# Patient Record
Sex: FEMALE | Race: WHITE | Employment: FULL TIME | URBAN - METROPOLITAN AREA
[De-identification: names, ages, dates, MRNs, and addresses within clinical notes are randomized per-mention and may not be internally consistent; named-entity substitution may affect disease eponyms.]

---

## 2017-03-22 ENCOUNTER — OFFICE VISIT (OUTPATIENT)
Dept: FAMILY MEDICINE | Facility: CLINIC | Age: 42
End: 2017-03-22
Payer: COMMERCIAL

## 2017-03-22 VITALS
DIASTOLIC BLOOD PRESSURE: 73 MMHG | HEIGHT: 63 IN | TEMPERATURE: 97.3 F | OXYGEN SATURATION: 95 % | SYSTOLIC BLOOD PRESSURE: 111 MMHG | HEART RATE: 84 BPM | BODY MASS INDEX: 22.01 KG/M2 | WEIGHT: 124.2 LBS

## 2017-03-22 DIAGNOSIS — J40 BRONCHITIS: Primary | ICD-10-CM

## 2017-03-22 PROCEDURE — 99213 OFFICE O/P EST LOW 20 MIN: CPT | Performed by: NURSE PRACTITIONER

## 2017-03-22 RX ORDER — ALBUTEROL SULFATE 90 UG/1
2 AEROSOL, METERED RESPIRATORY (INHALATION) EVERY 6 HOURS PRN
Qty: 1 INHALER | Refills: 0 | Status: SHIPPED | OUTPATIENT
Start: 2017-03-22 | End: 2017-08-30

## 2017-03-22 RX ORDER — BENZONATATE 200 MG/1
200 CAPSULE ORAL 3 TIMES DAILY PRN
Qty: 21 CAPSULE | Refills: 0 | Status: SHIPPED | OUTPATIENT
Start: 2017-03-22 | End: 2017-04-11

## 2017-03-22 RX ORDER — CODEINE PHOSPHATE AND GUAIFENESIN 10; 100 MG/5ML; MG/5ML
1 SOLUTION ORAL EVERY 4 HOURS PRN
Qty: 120 ML | Refills: 0 | Status: SHIPPED | OUTPATIENT
Start: 2017-03-22 | End: 2017-04-11

## 2017-03-22 NOTE — PATIENT INSTRUCTIONS
1. Use albuterol inhaler as needed for coughing, wheezing or shortness of breath  2. Use cough syrup at bedtime and take Tessalon perles as needed during the day for cough          Thank you for choosing [facility].  You may be receiving a survey in the mail from Tim Saenz regarding your visit today.  Please take a few minutes to complete and return the survey to let us know how we are doing.      If you have questions or concerns, please contact us via [employer] or you can contact your care team at [phone].    Our Clinic hours are:  Monday 6:40 am  to 7:00 pm  Tuesday -Friday 6:40 am to 5:00 pm    The [place] outpatient lab hours are:  Monday - Friday 6:10 am to 4:45 pm  Saturdays 7:00 am to 11:00 am  Appointments are required, call [phone]    If you have clinical questions after hours or would like to schedule an appointment,  call the clinic at [phone].

## 2017-03-22 NOTE — NURSING NOTE
"Chief Complaint   Patient presents with     Cough     cough, started Saturday, has gotten worse, chest congestion       Initial /73 (BP Location: Left arm, Patient Position: Chair, Cuff Size: Adult Regular)  Pulse 84  Temp 97.3  F (36.3  C) (Tympanic)  Ht 5' 2.75\" (1.594 m)  Wt 124 lb 3.2 oz (56.3 kg)  LMP 08/12/2016  SpO2 95%  BMI 22.18 kg/m2 Estimated body mass index is 22.18 kg/(m^2) as calculated from the following:    Height as of this encounter: 5' 2.75\" (1.594 m).    Weight as of this encounter: 124 lb 3.2 oz (56.3 kg).  Medication Reconciliation: complete    "

## 2017-03-22 NOTE — PROGRESS NOTES
SUBJECTIVE:                                                    Vianey Ryan is a 41 year old female who presents to clinic today for the following health issues:    Acute Illness- 3 days-    Acute illness concerns: cough  Onset: started saturday    Fever: YES- mild one yesterday    Chills/Sweats: YES    Headache (location?): no    Sinus Pressure:no    Conjunctivitis:  no    Ear Pain: no    Rhinorrhea: no    Congestion: YES    Sore Throat: YES     Cough: YES-non-productive, worsening over time    Wheeze: YES    Decreased Appetite: YES    Nausea: no    Vomiting: no    Diarrhea:  no    Dysuria/Freq.: no    Fatigue/Achiness: YES- fatigue    Sick/Strep Exposure: YES- 3 kids    Coughing X 3 days- unable to sleep. No fevers. Sore throat from coughing. No sinus pressure.  No history of asthma.      Therapies Tried and outcome: ibuprofen      -------------------------------------    Problem list and histories reviewed & adjusted, as indicated.  Additional history: as documented    Patient Active Problem List   Diagnosis     Basal cell carcinoma of forehead     IBS (irritable bowel syndrome)     CARDIOVASCULAR SCREENING; LDL GOAL LESS THAN 160     Abnormal thyroid function test     health care home     Goiter     Hyperthyroidism     Past Surgical History:   Procedure Laterality Date     C/SECTION, LOW TRANSVERSE  2006    Severe pre-eclampsia      SECTION, TUBAL LIGATION, COMBINED N/A 4/3/2015    Procedure: COMBINED  SECTION, TUBAL LIGATION;  Surgeon: Tay Mosqueda MD;  Location: [facility]     COLONOSCOPY  2013    Procedure: COLONOSCOPY;  Colonoscopy;  Surgeon: Manan Rangel MD;  Location: [facility]     CYSTOSCOPY N/A 2016    Procedure: CYSTOSCOPY;  Surgeon: Tay Mosqueda MD;  Location: [facility]     D & C  8/6/10     HC MOHS HEAD/NCK/HND/FT/GEN 1ST STAGE UP T0 5 BLOCKS  09    Mohs excision right central forehead BCC     HYSTEROSCOPY  8/6/10     LAPAROSCOPIC CHOLECYSTECTOMY N/A  "9/29/2015    Procedure: LAPAROSCOPIC CHOLECYSTECTOMY;  Surgeon: Landon Schultz MD;  Location: [facility]     LAPAROSCOPIC HYSTERECTOMY TOTAL N/A 8/29/2016    Procedure: LAPAROSCOPIC HYSTERECTOMY TOTAL;  Surgeon: Tay Mosqueda MD;  Location: [facility]     MOHS MICROGRAPHIC PROCEDURE       TONSILLECTOMY         Social History   Substance Use Topics     Smoking status: Never Smoker     Smokeless tobacco: Never Used     Alcohol use No     Family History   Problem Relation Age of Onset     Hypertension Father      Hypertension Paternal Grandmother      Lipids Paternal Grandmother      Thyroid Disease Paternal Grandmother      Blood Disease Mother      Anemia     DIABETES Mother      \"borderline diabetic\"     Alcohol/Drug Paternal Grandfather      alcohol     CANCER Maternal Grandfather      skin     Alzheimer Disease Maternal Grandmother      Breast Cancer No family hx of            Reviewed and updated as needed this visit by clinical staff  Tobacco  Allergies  Med Hx  Surg Hx  Fam Hx  Soc Hx      Reviewed and updated as needed this visit by Provider         ROS:  Constitutional, HEENT, cardiovascular, pulmonary, GI, , musculoskeletal, neuro, skin, endocrine and psych systems are negative, except as otherwise noted.    OBJECTIVE:                                                    /73 (BP Location: Left arm, Patient Position: Chair, Cuff Size: Adult Regular)  Pulse 84  Temp 97.3  F (36.3  C) (Tympanic)  Ht 5' 2.75\" (1.594 m)  Wt 124 lb 3.2 oz (56.3 kg)  LMP 08/12/2016  SpO2 95%  BMI 22.18 kg/m2  Body mass index is 22.18 kg/(m^2).  GENERAL: alert, no distress, fatigued and non productive cough during exam  HENT: normal cephalic/atraumatic, ear canals and TM's normal, nose and mouth without ulcers or lesions, oropharynx clear and oral mucous membranes moist  NECK: supple  RESP: lungs clear to auscultation - no rales, rhonchi or wheezes  CV: regular rate and rhythm, normal S1 S2, no S3 or S4, no murmur, " click or rub  MS: no gross musculoskeletal defects noted, no edema    Diagnostic Test Results:  none      ASSESSMENT/PLAN:                                                        1. Bronchitis  Cough X 3 days  - guaiFENesin-codeine (ROBITUSSIN AC) 100-10 MG/5ML SOLN solution; Take 5 mLs by mouth every 4 hours as needed for cough  Dispense: 120 mL; Refill: 0  - albuterol (PROAIR HFA/PROVENTIL HFA/VENTOLIN HFA) 108 (90 BASE) MCG/ACT Inhaler; Inhale 2 puffs into the lungs every 6 hours as needed for shortness of breath / dyspnea or wheezing  Dispense: 1 Inhaler; Refill: 0  - benzonatate (TESSALON) 200 MG capsule; Take 1 capsule (200 mg) by mouth 3 times daily as needed for cough  Dispense: 21 capsule; Refill: 0        YAO [facility]

## 2017-03-22 NOTE — MR AVS SNAPSHOT
After Visit Summary   3/22/2017    Vianey Ryan    MRN: [id number]           Patient Information     Date Of Birth          1975        Visit Information        Provider Department      3/22/2017 7:00 AM Abby Yang APRN CNP [facility]        Today's Diagnoses     Bronchitis    -  1      Care Instructions    1. Use albuterol inhaler as needed for coughing, wheezing or shortness of breath  2. Use cough syrup at bedtime and take Tessalon perles as needed during the day for cough          Thank you for choosing [facility].  You may be receiving a survey in the mail from [employer] regarding your visit today.  Please take a few minutes to complete and return the survey to let us know how we are doing.      If you have questions or concerns, please contact us via [employer] or you can contact your care team at [phone].    Our Clinic hours are:  Monday 6:40 am  to 7:00 pm  Tuesday -Friday 6:40 am to 5:00 pm    The [place] outpatient lab hours are:  Monday - Friday 6:10 am to 4:45 pm  Saturdays 7:00 am to 11:00 am  Appointments are required, call [phone]    If you have clinical questions after hours or would like to schedule an appointment,  call the clinic at [phone].        Follow-ups after your visit        Who to contact     If you have questions or need follow up information about today's clinic visit or your schedule please contact [facility] directly at [phone].  Normal or non-critical lab and imaging results will be communicated to you by [employer], letter or phone within 4 business days after the clinic has received the results. If you do not hear from us within 7 days, please contact the clinic through [employer] or phone. If you have a critical or abnormal lab result, we will notify you by phone as soon as possible.  Submit refill requests through [employer] or call your pharmacy and they will forward the refill request to us. Please allow  "3 business days for your refill to be completed.          Additional Information About Your Visit        [employer] Information     [employer] gives you secure access to your electronic health record. If you see a primary care provider, you can also send messages to your care team and make appointments. If you have questions, please call your primary care clinic.  If you do not have a primary care provider, please call [phone] and they will assist you.        Care EveryWhere ID     This is your Care EveryWhere ID. This could be used by other organizations to access your [facility] medical records  [id number]        Your Vitals Were     Pulse Temperature Height Last Period Pulse Oximetry BMI (Body Mass Index)    84 97.3  F (36.3  C) (Tympanic) 5' 2.75\" (1.594 m) 08/12/2016 95% 22.18 kg/m2       Blood Pressure from Last 3 Encounters:   03/22/17 111/73   10/18/16 110/78   08/29/16 (P) 117/76    Weight from Last 3 Encounters:   03/22/17 124 lb 3.2 oz (56.3 kg)   10/18/16 124 lb (56.2 kg)   08/29/16 125 lb (56.7 kg)              Today, you had the following     No orders found for display         Today's Medication Changes          These changes are accurate as of: 3/22/17  7:15 AM.  If you have any questions, ask your nurse or doctor.               Start taking these medicines.        Dose/Directions    albuterol 108 (90 BASE) MCG/ACT Inhaler   Commonly known as:  PROAIR HFA/PROVENTIL HFA/VENTOLIN HFA   Used for:  Bronchitis   Started by:  Abby Yang APRN CNP        Dose:  2 puff   Inhale 2 puffs into the lungs every 6 hours as needed for shortness of breath / dyspnea or wheezing   Quantity:  1 Inhaler   Refills:  0       benzonatate 200 MG capsule   Commonly known as:  TESSALON   Used for:  Bronchitis   Started by:  Abby Ynag APRN CNP        Dose:  200 mg   Take 1 capsule (200 mg) by mouth 3 times daily as needed for cough   Quantity:  21 capsule   Refills:  0       guaiFENesin-codeine 100-10 MG/5ML " Soln solution   Commonly known as:  ROBITUSSIN AC   Used for:  Bronchitis   Started by:  Abby Yang APRN CNP        Dose:  1 tsp.   Take 5 mLs by mouth every 4 hours as needed for cough   Quantity:  120 mL   Refills:  0            Where to get your medicines      These medications were sent to [facility] [place], [place] - [address]  [address] [address]     Phone:  [phone]     albuterol 108 (90 BASE) MCG/ACT Inhaler    benzonatate 200 MG capsule         Some of these will need a paper prescription and others can be bought over the counter.  Ask your nurse if you have questions.     Bring a paper prescription for each of these medications     guaiFENesin-codeine 100-10 MG/5ML Soln solution                Primary Care Provider Office Phone # Fax #    Joes Hearn [phone]       [facility] [address] [address]        Thank you!     Thank you for choosing [facility]  for your care. Our goal is always to provide you with excellent care. Hearing back from our patients is one way we can continue to improve our services. Please take a few minutes to complete the written survey that you may receive in the mail after your visit with us. Thank you!             Your Updated Medication List - Protect others around you: Learn how to safely use, store and throw away your medicines at www.disposemymeds.org.          This list is accurate as of: 3/22/17  7:15 AM.  Always use your most recent med list.                   Brand Name Dispense Instructions for use    albuterol 108 (90 BASE) MCG/ACT Inhaler    PROAIR HFA/PROVENTIL HFA/VENTOLIN HFA    1 Inhaler    Inhale 2 puffs into the lungs every 6 hours as needed for shortness of breath / dyspnea or wheezing       benzonatate 200 MG capsule    TESSALON    21 capsule    Take 1 capsule (200 mg) by mouth 3 times daily as needed for cough       guaiFENesin-codeine 100-10  MG/5ML Soln solution    ROBITUSSIN AC    120 mL    Take 5 mLs by mouth every 4 hours as needed for cough

## 2017-03-23 ENCOUNTER — APPOINTMENT (OUTPATIENT)
Dept: GENERAL RADIOLOGY | Facility: CLINIC | Age: 42
End: 2017-03-23
Attending: EMERGENCY MEDICINE
Payer: COMMERCIAL

## 2017-03-23 ENCOUNTER — HOSPITAL ENCOUNTER (EMERGENCY)
Facility: CLINIC | Age: 42
Discharge: HOME | End: 2017-03-23
Attending: EMERGENCY MEDICINE | Admitting: EMERGENCY MEDICINE
Payer: COMMERCIAL

## 2017-03-23 VITALS
DIASTOLIC BLOOD PRESSURE: 72 MMHG | HEART RATE: 86 BPM | TEMPERATURE: 99.1 F | OXYGEN SATURATION: 99 % | SYSTOLIC BLOOD PRESSURE: 120 MMHG | RESPIRATION RATE: 16 BRPM

## 2017-03-23 DIAGNOSIS — J11.1 INFLUENZA-LIKE ILLNESS: ICD-10-CM

## 2017-03-23 DIAGNOSIS — H65.92 LEFT NON-SUPPURATIVE OTITIS MEDIA: ICD-10-CM

## 2017-03-23 LAB
ANION GAP SERPL CALCULATED.3IONS-SCNC: 7 MMOL/L (ref 3–14)
BASOPHILS # BLD AUTO: 0 10E9/L (ref 0–0.2)
BASOPHILS NFR BLD AUTO: 0.1 %
BUN SERPL-MCNC: 11 MG/DL (ref 7–30)
CALCIUM SERPL-MCNC: 8.3 MG/DL (ref 8.5–10.1)
CHLORIDE SERPL-SCNC: 109 MMOL/L (ref 94–109)
CO2 SERPL-SCNC: 26 MMOL/L (ref 20–32)
CREAT SERPL-MCNC: 0.65 MG/DL (ref 0.52–1.04)
DIFFERENTIAL METHOD BLD: ABNORMAL
EOSINOPHIL # BLD AUTO: 0 10E9/L (ref 0–0.7)
EOSINOPHIL NFR BLD AUTO: 0.4 %
ERYTHROCYTE [DISTWIDTH] IN BLOOD BY AUTOMATED COUNT: 12.1 % (ref 10–15)
FLUAV+FLUBV AG SPEC QL: NEGATIVE
FLUAV+FLUBV AG SPEC QL: NORMAL
GFR SERPL CREATININE-BSD FRML MDRD: ABNORMAL ML/MIN/1.7M2
GLUCOSE SERPL-MCNC: 120 MG/DL (ref 70–99)
HCT VFR BLD AUTO: 36.2 % (ref 35–47)
HGB BLD-MCNC: 12.3 G/DL (ref 11.7–15.7)
IMM GRANULOCYTES # BLD: 0 10E9/L (ref 0–0.4)
IMM GRANULOCYTES NFR BLD: 0.1 %
LACTATE BLD-SCNC: 0.5 MMOL/L (ref 0.7–2.1)
LYMPHOCYTES # BLD AUTO: 0.5 10E9/L (ref 0.8–5.3)
LYMPHOCYTES NFR BLD AUTO: 7.4 %
MCH RBC QN AUTO: 30 PG (ref 26.5–33)
MCHC RBC AUTO-ENTMCNC: 34 G/DL (ref 31.5–36.5)
MCV RBC AUTO: 88 FL (ref 78–100)
MONOCYTES # BLD AUTO: 0.7 10E9/L (ref 0–1.3)
MONOCYTES NFR BLD AUTO: 10 %
NEUTROPHILS # BLD AUTO: 5.7 10E9/L (ref 1.6–8.3)
NEUTROPHILS NFR BLD AUTO: 82 %
PLATELET # BLD AUTO: 165 10E9/L (ref 150–450)
POTASSIUM SERPL-SCNC: 3.4 MMOL/L (ref 3.4–5.3)
RBC # BLD AUTO: 4.1 10E12/L (ref 3.8–5.2)
SODIUM SERPL-SCNC: 142 MMOL/L (ref 133–144)
SPECIMEN SOURCE: NORMAL
WBC # BLD AUTO: 7 10E9/L (ref 4–11)

## 2017-03-23 PROCEDURE — 71020 XR CHEST 2 VW: CPT

## 2017-03-23 PROCEDURE — 87804 INFLUENZA ASSAY W/OPTIC: CPT | Performed by: EMERGENCY MEDICINE

## 2017-03-23 PROCEDURE — 96361 HYDRATE IV INFUSION ADD-ON: CPT

## 2017-03-23 PROCEDURE — 85025 COMPLETE CBC W/AUTO DIFF WBC: CPT | Performed by: EMERGENCY MEDICINE

## 2017-03-23 PROCEDURE — 99284 EMERGENCY DEPT VISIT MOD MDM: CPT | Performed by: EMERGENCY MEDICINE

## 2017-03-23 PROCEDURE — 99284 EMERGENCY DEPT VISIT MOD MDM: CPT | Mod: 25

## 2017-03-23 PROCEDURE — 96375 TX/PRO/DX INJ NEW DRUG ADDON: CPT

## 2017-03-23 PROCEDURE — 83605 ASSAY OF LACTIC ACID: CPT | Performed by: EMERGENCY MEDICINE

## 2017-03-23 PROCEDURE — 80048 BASIC METABOLIC PNL TOTAL CA: CPT | Performed by: EMERGENCY MEDICINE

## 2017-03-23 PROCEDURE — 96374 THER/PROPH/DIAG INJ IV PUSH: CPT

## 2017-03-23 PROCEDURE — 25000128 H RX IP 250 OP 636: Performed by: EMERGENCY MEDICINE

## 2017-03-23 RX ORDER — SODIUM CHLORIDE 9 MG/ML
1000 INJECTION, SOLUTION INTRAVENOUS CONTINUOUS
Status: DISCONTINUED | OUTPATIENT
Start: 2017-03-23 | End: 2017-03-23 | Stop reason: HOSPADM

## 2017-03-23 RX ORDER — PREDNISONE 20 MG/1
40 TABLET ORAL DAILY
Qty: 10 TABLET | Refills: 0 | Status: SHIPPED | OUTPATIENT
Start: 2017-03-23 | End: 2017-03-28

## 2017-03-23 RX ORDER — METOCLOPRAMIDE HYDROCHLORIDE 5 MG/ML
5 INJECTION INTRAMUSCULAR; INTRAVENOUS ONCE
Status: COMPLETED | OUTPATIENT
Start: 2017-03-23 | End: 2017-03-23

## 2017-03-23 RX ORDER — AMOXICILLIN 875 MG
875 TABLET ORAL 2 TIMES DAILY
Qty: 20 TABLET | Refills: 0 | Status: SHIPPED | OUTPATIENT
Start: 2017-03-23 | End: 2017-04-02

## 2017-03-23 RX ORDER — DIPHENHYDRAMINE HYDROCHLORIDE 50 MG/ML
12.5 INJECTION INTRAMUSCULAR; INTRAVENOUS ONCE
Status: COMPLETED | OUTPATIENT
Start: 2017-03-23 | End: 2017-03-23

## 2017-03-23 RX ORDER — KETOROLAC TROMETHAMINE 30 MG/ML
30 INJECTION, SOLUTION INTRAMUSCULAR; INTRAVENOUS ONCE
Status: COMPLETED | OUTPATIENT
Start: 2017-03-23 | End: 2017-03-23

## 2017-03-23 RX ADMIN — METOCLOPRAMIDE 5 MG: 5 INJECTION, SOLUTION INTRAMUSCULAR; INTRAVENOUS at 10:20

## 2017-03-23 RX ADMIN — SODIUM CHLORIDE 1000 ML: 9 INJECTION, SOLUTION INTRAVENOUS at 10:16

## 2017-03-23 RX ADMIN — DIPHENHYDRAMINE HYDROCHLORIDE 12.5 MG: 50 INJECTION, SOLUTION INTRAMUSCULAR; INTRAVENOUS at 10:17

## 2017-03-23 RX ADMIN — KETOROLAC TROMETHAMINE 30 MG: 30 INJECTION, SOLUTION INTRAMUSCULAR at 10:18

## 2017-03-23 NOTE — ED PROVIDER NOTES
Chief complaint high headache cough    41-year-old female nonsmoker, received influenza vaccine this year, presents with cough that began 5 days ago, low-grade fever throughout, developed worsening headache global overnight last night, left ear pain described as pressure and decreased hearing on that side.  Denies dyspnea on exertion or near syncope.  She's had nausea without vomiting.  No solid intake today, she did urinate once so far.  Son and daughter with similar symptoms.  No history of asthma.    Past medical history, medications, allergies, social history, family history all reviewed.  Patient Active Problem List   Diagnosis     Basal cell carcinoma of forehead     IBS (irritable bowel syndrome)     CARDIOVASCULAR SCREENING; LDL GOAL LESS THAN 160     Abnormal thyroid function test     health care home     Goiter     Hyperthyroidism     ROS: All other systems reviewed and are negative.    /70  Temp 99.1  F (37.3  C) (Temporal)  Resp 16  LMP 08/12/2016  SpO2 95%  Nontoxic appearing no respiratory distress alert and oriented ×3  Head atraumatic normocephalic  TMs/EACs normal except left TM dull red and retracted,, conjunctiva noninjected, oropharynx moist without lesions or erythema  No cervical adenopathy neck supple full active painless range of motion  Lungs clear to auscultation, scant wheeze/rales right base, harsh loose cough  Heart regular tachycardic no murmur  Abdomen soft nontender bowel sounds positive no masses or HSM  Strength and sensation grossly intact throughout the extremities, gait and station normal  Speech is fluent, good eye contact, thought processes are rational    Results for orders placed or performed during the hospital encounter of 03/23/17   XR Chest 2 Views    Narrative    XR CHEST 2 VW  3/23/2017 10:44 AM    HISTORY:  cough and fever    COMPARISON:  4/8/2008      Impression    IMPRESSION:  Negative.     EFRAIN MONTERROSO MD   CBC with platelets differential   Result Value  Ref Range    WBC 7.0 4.0 - 11.0 10e9/L    RBC Count 4.10 3.8 - 5.2 10e12/L    Hemoglobin 12.3 11.7 - 15.7 g/dL    Hematocrit 36.2 35.0 - 47.0 %    MCV 88 78 - 100 fl    MCH 30.0 26.5 - 33.0 pg    MCHC 34.0 31.5 - 36.5 g/dL    RDW 12.1 10.0 - 15.0 %    Platelet Count 165 150 - 450 10e9/L    Diff Method Automated Method     % Neutrophils 82.0 %    % Lymphocytes 7.4 %    % Monocytes 10.0 %    % Eosinophils 0.4 %    % Basophils 0.1 %    % Immature Granulocytes 0.1 %    Absolute Neutrophil 5.7 1.6 - 8.3 10e9/L    Absolute Lymphocytes 0.5 (L) 0.8 - 5.3 10e9/L    Absolute Monocytes 0.7 0.0 - 1.3 10e9/L    Absolute Eosinophils 0.0 0.0 - 0.7 10e9/L    Absolute Basophils 0.0 0.0 - 0.2 10e9/L    Abs Immature Granulocytes 0.0 0 - 0.4 10e9/L   Basic metabolic panel   Result Value Ref Range    Sodium 142 133 - 144 mmol/L    Potassium 3.4 3.4 - 5.3 mmol/L    Chloride 109 94 - 109 mmol/L    Carbon Dioxide 26 20 - 32 mmol/L    Anion Gap 7 3 - 14 mmol/L    Glucose 120 (H) 70 - 99 mg/dL    Urea Nitrogen 11 7 - 30 mg/dL    Creatinine 0.65 0.52 - 1.04 mg/dL    GFR Estimate >90  Non  GFR Calc   >60 mL/min/1.7m2    GFR Estimate If Black >90   GFR Calc   >60 mL/min/1.7m2    Calcium 8.3 (L) 8.5 - 10.1 mg/dL   Lactic acid whole blood   Result Value Ref Range    Lactic Acid 0.5 (L) 0.7 - 2.1 mmol/L   Influenza A/B antigen   Result Value Ref Range    Influenza A/B Agn Specimen Nasal     Influenza A Negative NEG    Influenza B  NEG     Negative   Test results must be correlated with clinical data. If necessary, results   should be confirmed by a molecular assay or viral culture.       Medications   0.9% sodium chloride BOLUS (0 mLs Intravenous Stopped 3/23/17 1308)   ketorolac (TORADOL) injection 30 mg (30 mg Intravenous Given 3/23/17 1018)   metoclopramide (REGLAN) injection 5 mg (5 mg Intravenous Given 3/23/17 1020)   diphenhydrAMINE (BENADRYL) injection 12.5 mg (12.5 mg Intravenous Given 3/23/17 1017)     MDM:  41-year-old female presents with influenza-like illness headache left otalgia.  Workup is unremarkable.  Duration of symptoms precludes treatment with antiviral.  Feeling better after above regimen.  Treat left ear with amoxicillin.  Discharged home in improved stable condition care of her .  Return criteria viewed.  Patient expressed understanding and agreement.    Impression: Influenza-like illness, left otitis media     Morgna Diaz MD  03/23/17 6099

## 2017-03-23 NOTE — ED AVS SNAPSHOT
[facility]    [address] [facility] [address]    Phone:  [phone]    Fax:  [phone]                                       Vianey Ryan   MRN: [id number]    Department:  [facility]   Date of Visit:  3/23/2017           Patient Information     Date Of Birth          1975        Your diagnoses for this visit were:     Influenza-like illness     Left non-suppurative otitis media        You were seen by Morgan Diaz MD.      Follow-up Information     Follow up with Jose Hearn MD.    Specialty:  Family Practice    Contact information:    [facility]  [address] [facility] [address]  [phone]          Discharge Instructions         Middle Ear Infection (Adult)  You have an infection of the middle ear (the space behind the eardrum). This is also called acute otitis media (AOM). Sometimes it is caused by the common cold. This is because congestion can block the internal passage (eustachian tube) that drains fluid from the middle ear. When the middle ear fills with fluid, bacteria can grow there and cause an infection. Oral antibiotics are used to treat this illness, not ear drops. Symptoms usually start to improve within 1 to 2 days of treatment.    Home care  The following are general care guidelines:    Finish all of the antibiotic medicine given, even though you may feel better after the first few days.    You may use acetaminophen or ibuprofen to control pain, unless something else was prescribed. [NOTE: If you have chronic liver or kidney disease or have ever had a stomach ulcer or GI bleeding, talk with your doctor before using these medicines.] Do not give aspirin to anyone under 18 years of age who has a fever. It may cause severe liver damage.  Follow-up care  Follow up with your doctor in 2 weeks if all symptoms have not gotten better, or if hearing doesn't go back to normal within 1 month.  When to  seek medical care  Get prompt medical attention if any of the following occur:    Ear pain gets worse or does not improve after 3 days of treatment    Unusual drowsiness or confusion    Neck pain, stiff neck, or headache    Fluid or blood draining from the ear canal    Fever of 100.4 F (38 C) or higher after 3 days of antibiotics, or as directed by your health care provider    Convulsion (seizure)    7658-8066 [employer]. [address] [place] [address], [place] [address]. All rights reserved. This information is not intended as a substitute for professional medical care. Always follow your healthcare professional's instructions.          Discharge References/Attachments     INFLUENZA  (ENGLISH)      24 Hour Appointment Hotline       To make an appointment at any [facility], call [phone] ([phone]). If you don't have a family doctor or clinic, we will help you find one. [place] clinics are conveniently located to serve the needs of you and your family.             Review of your medicines      START taking        Dose / Directions Last dose taken    amoxicillin 875 MG tablet   Commonly known as:  AMOXIL   Dose:  875 mg   Quantity:  20 tablet        Take 1 tablet (875 mg) by mouth 2 times daily for 10 days   Refills:  0        predniSONE 20 MG tablet   Commonly known as:  DELTASONE   Dose:  40 mg   Quantity:  10 tablet        Take 2 tablets (40 mg) by mouth daily for 5 days   Refills:  0          Our records show that you are taking the medicines listed below. If these are incorrect, please call your family doctor or clinic.        Dose / Directions Last dose taken    albuterol 108 (90 BASE) MCG/ACT Inhaler   Commonly known as:  PROAIR HFA/PROVENTIL HFA/VENTOLIN HFA   Dose:  2 puff   Quantity:  1 Inhaler        Inhale 2 puffs into the lungs every 6 hours as needed for shortness of breath / dyspnea or wheezing   Refills:  0        benzonatate 200 MG capsule   Commonly known as:  TESSALON    Dose:  200 mg   Quantity:  21 capsule        Take 1 capsule (200 mg) by mouth 3 times daily as needed for cough   Refills:  0        guaiFENesin-codeine 100-10 MG/5ML Soln solution   Commonly known as:  ROBITUSSIN AC   Dose:  1 tsp.   Quantity:  120 mL        Take 5 mLs by mouth every 4 hours as needed for cough   Refills:  0                Prescriptions were sent or printed at these locations (2 Prescriptions)                   [place], [place] - [address] [facility]   [address] [facility] [address]    Telephone:  [phone]   Fax:  [phone]   Hours:                  E-Prescribed (2 of 2)         predniSONE (DELTASONE) 20 MG tablet               amoxicillin (AMOXIL) 875 MG tablet                Procedures and tests performed during your visit     Basic metabolic panel    CBC with platelets differential    Influenza A/B antigen    Lactic acid whole blood    XR Chest 2 Views      Orders Needing Specimen Collection     None      Pending Results     No orders found from 3/21/2017 to 3/24/2017.            Pending Culture Results     No orders found from 3/21/2017 to 3/24/2017.             Test Results from your hospital stay     3/23/2017 10:26 AM - Interface, [facility] Results      Component Results     Component Value Ref Range & Units Status    WBC 7.0 4.0 - 11.0 10e9/L Final    RBC Count 4.10 3.8 - 5.2 10e12/L Final    Hemoglobin 12.3 11.7 - 15.7 g/dL Final    Hematocrit 36.2 35.0 - 47.0 % Final    MCV 88 78 - 100 fl Final    MCH 30.0 26.5 - 33.0 pg Final    MCHC 34.0 31.5 - 36.5 g/dL Final    RDW 12.1 10.0 - 15.0 % Final    Platelet Count 165 150 - 450 10e9/L Final    Diff Method Automated Method  Final    % Neutrophils 82.0 % Final    % Lymphocytes 7.4 % Final    % Monocytes 10.0 % Final    % Eosinophils 0.4 % Final    % Basophils 0.1 % Final    % Immature Granulocytes 0.1 % Final    Absolute Neutrophil 5.7 1.6 - 8.3 10e9/L Final    Absolute Lymphocytes 0.5 (L) 0.8 - 5.3 10e9/L  Final    Absolute Monocytes 0.7 0.0 - 1.3 10e9/L Final    Absolute Eosinophils 0.0 0.0 - 0.7 10e9/L Final    Absolute Basophils 0.0 0.0 - 0.2 10e9/L Final    Abs Immature Granulocytes 0.0 0 - 0.4 10e9/L Final         3/23/2017 10:50 AM - Interface, [employer] Results      Component Results     Component Value Ref Range & Units Status    Sodium 142 133 - 144 mmol/L Final    Potassium 3.4 3.4 - 5.3 mmol/L Final    Chloride 109 94 - 109 mmol/L Final    Carbon Dioxide 26 20 - 32 mmol/L Final    Anion Gap 7 3 - 14 mmol/L Final    Glucose 120 (H) 70 - 99 mg/dL Final    Urea Nitrogen 11 7 - 30 mg/dL Final    Creatinine 0.65 0.52 - 1.04 mg/dL Final    GFR Estimate >90  Non  GFR Calc   >60 mL/min/1.7m2 Final    GFR Estimate If Black >90   GFR Calc   >60 mL/min/1.7m2 Final    Calcium 8.3 (L) 8.5 - 10.1 mg/dL Final         3/23/2017 10:26 AM - Interface, [employer] Results      Component Results     Component Value Ref Range & Units Status    Lactic Acid 0.5 (L) 0.7 - 2.1 mmol/L Final         3/23/2017 10:51 AM - Interface, [facility] Ib      Narrative     XR CHEST 2 VW  3/23/2017 10:44 AM    HISTORY:  cough and fever    COMPARISON:  4/8/2008        Impression     IMPRESSION:  Negative.     EFRAIN MONTERROSO MD         3/23/2017 10:51 AM - Interface, [employer] Results      Component Results     Component Value Ref Range & Units Status    Influenza A/B Agn Specimen Nasal  Final    Influenza A Negative NEG Final    Influenza B  NEG Final    Negative   Test results must be correlated with clinical data. If necessary, results   should be confirmed by a molecular assay or viral culture.                  Thank you for choosing [facility]       Thank you for choosing [facility] for your care. Our goal is always to provide you with excellent care. Hearing back from our patients is one way we can continue to improve our services. Please take a few minutes to complete the written survey that you may receive in the mail  after you visit with us. Thank you!        [employer] Information     [employer] gives you secure access to your electronic health record. If you see a primary care provider, you can also send messages to your care team and make appointments. If you have questions, please call your primary care clinic.  If you do not have a primary care provider, please call [phone] and they will assist you.        Care EveryWhere ID     This is your Care EveryWhere ID. This could be used by other organizations to access your [facility] medical records  [id number]        After Visit Summary       This is your record. Keep this with you and show to your community pharmacist(s) and doctor(s) at your next visit.

## 2017-03-23 NOTE — ED AVS SNAPSHOT
[facility]    [address] [facility] [address]    Phone:  [phone]    Fax:  [phone]                                       Vianey Ryan   MRN: [id number]    Department:  [facility]   Date of Visit:  3/23/2017           After Visit Summary Signature Page     I have received my discharge instructions, and my questions have been answered. I have discussed any challenges I see with this plan with the nurse or doctor.    ..........................................................................................................................................  Patient/Patient Representative Signature      ..........................................................................................................................................  Patient Representative Print Name and Relationship to Patient    ..................................................               ................................................  Date                                            Time    ..........................................................................................................................................  Reviewed by Signature/Title    ...................................................              ..............................................  Date                                                            Time

## 2017-03-23 NOTE — DISCHARGE INSTRUCTIONS
Middle Ear Infection (Adult)  You have an infection of the middle ear (the space behind the eardrum). This is also called acute otitis media (AOM). Sometimes it is caused by the common cold. This is because congestion can block the internal passage (eustachian tube) that drains fluid from the middle ear. When the middle ear fills with fluid, bacteria can grow there and cause an infection. Oral antibiotics are used to treat this illness, not ear drops. Symptoms usually start to improve within 1 to 2 days of treatment.    Home care  The following are general care guidelines:    Finish all of the antibiotic medicine given, even though you may feel better after the first few days.    You may use acetaminophen or ibuprofen to control pain, unless something else was prescribed. [NOTE: If you have chronic liver or kidney disease or have ever had a stomach ulcer or GI bleeding, talk with your doctor before using these medicines.] Do not give aspirin to anyone under 18 years of age who has a fever. It may cause severe liver damage.  Follow-up care  Follow up with your doctor in 2 weeks if all symptoms have not gotten better, or if hearing doesn't go back to normal within 1 month.  When to seek medical care  Get prompt medical attention if any of the following occur:    Ear pain gets worse or does not improve after 3 days of treatment    Unusual drowsiness or confusion    Neck pain, stiff neck, or headache    Fluid or blood draining from the ear canal    Fever of 100.4 F (38 C) or higher after 3 days of antibiotics, or as directed by your health care provider    Convulsion (seizure)    [id number] [employer]. [address] [place] [address], [place] [address]. All rights reserved. This information is not intended as a substitute for professional medical care. Always follow your healthcare professional's instructions.

## 2017-03-27 ENCOUNTER — OFFICE VISIT (OUTPATIENT)
Dept: FAMILY MEDICINE | Facility: CLINIC | Age: 42
End: 2017-03-27
Payer: COMMERCIAL

## 2017-03-27 VITALS
SYSTOLIC BLOOD PRESSURE: 119 MMHG | BODY MASS INDEX: 22.5 KG/M2 | DIASTOLIC BLOOD PRESSURE: 66 MMHG | OXYGEN SATURATION: 97 % | TEMPERATURE: 99.3 F | WEIGHT: 127 LBS | HEIGHT: 63 IN | HEART RATE: 93 BPM

## 2017-03-27 DIAGNOSIS — H69.92 DYSFUNCTION OF EUSTACHIAN TUBE, LEFT: Primary | ICD-10-CM

## 2017-03-27 PROCEDURE — 99213 OFFICE O/P EST LOW 20 MIN: CPT | Performed by: INTERNAL MEDICINE

## 2017-03-27 RX ORDER — FLUTICASONE PROPIONATE 50 MCG
1-2 SPRAY, SUSPENSION (ML) NASAL DAILY
Qty: 1 BOTTLE | Refills: 1 | Status: SHIPPED | OUTPATIENT
Start: 2017-03-27 | End: 2017-04-11

## 2017-03-27 NOTE — PROGRESS NOTES
SUBJECTIVE:                                                    Vianey Ryan is a 41 year old female who presents to clinic today for the following health issues:  Chief Complaint   Patient presents with     ER F/U     seen 3/23/17 @ [facility], cough hanging on      Ear Fullness     x 5 day, left ear plugged and pressure      ED/UC Followup:    Facility:  [facility]   Date of visit: 3/23/17  Reason for visit: cough, ear pain and headache  Current Status: cough is still hanging on, ear fullness has gotten worse.  NO ear pain any more, but cannot hear out of ear.  Headache hanging on.   Now taking Tylenol and ibuprofen.         In the ED, she was prescribed prednisone and albuterol, but still continues to have a cough despite these.  She was given Tessalon and codeine cough syrup, but is no longer using these.  She was also prescribed amoxicillin for possible left ear infection, but she did not actually take this since she was breast feeding and her infant has an amoxicillin allergy and she didn't want to hold on the breast feeding while taking antibiotics.  Her ear pain resolved even though she didn't take the amoxicillin but she still has reduced hearing on the left.      Problem list and histories reviewed & adjusted, as indicated.  Additional history: as documented    Current Outpatient Prescriptions   Medication Sig Dispense Refill     fluticasone (FLONASE) 50 MCG/ACT spray Spray 1-2 sprays into both nostrils daily 1 Bottle 1     albuterol (PROAIR HFA/PROVENTIL HFA/VENTOLIN HFA) 108 (90 BASE) MCG/ACT Inhaler Inhale 2 puffs into the lungs every 6 hours as needed for shortness of breath / dyspnea or wheezing 1 Inhaler 0     predniSONE (DELTASONE) 20 MG tablet Take 2 tablets (40 mg) by mouth daily for 5 days 10 tablet 0     amoxicillin (AMOXIL) 875 MG tablet Take 1 tablet (875 mg) by mouth 2 times daily for 10 days 20 tablet 0     guaiFENesin-codeine (ROBITUSSIN AC) 100-10 MG/5ML SOLN solution Take 5  "mLs by mouth every 4 hours as needed for cough 120 mL 0     benzonatate (TESSALON) 200 MG capsule Take 1 capsule (200 mg) by mouth 3 times daily as needed for cough 21 capsule 0     Allergies   Allergen Reactions     Nkda [No Known Drug Allergies]        Reviewed and updated as needed this visit by clinical staff  Tobacco  Allergies  Med Hx  Surg Hx  Fam Hx  Soc Hx      Reviewed and updated as needed this visit by Provider         ROS:  Constitutional, HEENT, pulmonary systems are negative, except as otherwise noted.    OBJECTIVE:                                                    /66 (BP Location: Left arm, Patient Position: Chair, Cuff Size: Adult Small)  Pulse 93  Temp 99.3  F (37.4  C) (Tympanic)  Ht 5' 2.5\" (1.588 m)  Wt 127 lb (57.6 kg)  LMP 08/12/2016  SpO2 97%  BMI 22.86 kg/m2  Body mass index is 22.86 kg/(m^2).    GENERAL: alert and no distress  EYES: Eyes grossly normal to inspection, PERRL and conjunctivae and sclerae normal  HENT: ear canals normal, right TM is normal, left TM is red but not bulging, sinuses non tender to percussion, nose and mouth without ulcers or lesions, oropharynx red but no tonsillar exudates  NECK: no adenopathy, no asymmetry, masses, or scars  RESP: lungs clear to auscultation - no rales, rhonchi or wheezes  CV: regular rate and rhythm, normal S1 S2, no S3 or S4, no murmur, click or rub     Diagnostic Test Results:  none      ASSESSMENT/PLAN:                                                          1. Dysfunction of eustachian tube, left    Left ear pain improved even without taking antibiotics.  It is still red on exam today, but since pain is gone and it's not bulging, I do not feel it is infected.  Decreased hearing likely due to fluid accumulation from Eustachian tube dysfunction.  Recommended she try Flonase to help reduce swelling in the nose and hopefully also Eustachian tube to drain better.  If still not improving after another week or two, recommended " she follow-up with ENT, referral placed if needed.      - fluticasone (FLONASE) 50 MCG/ACT spray; Spray 1-2 sprays into both nostrils daily  Dispense: 1 Bottle; Refill: 1  - OTOLARYNGOLOGY REFERRAL      Frank Washburn MD  [facility]

## 2017-03-27 NOTE — PATIENT INSTRUCTIONS
Try some Flonase to help your ear drain.  When spraying into the nose, aim towards the ears for the best effect.

## 2017-03-27 NOTE — NURSING NOTE
"Chief Complaint   Patient presents with     ER F/U     seen 3/23/17 @ [facility], cough hanging on      Ear Fullness     x 5 day, left ear plugged and pressure        Initial /66 (BP Location: Left arm, Patient Position: Chair, Cuff Size: Adult Small)  Pulse 93  Temp 99.3  F (37.4  C) (Tympanic)  Ht 5' 2.5\" (1.588 m)  Wt 127 lb (57.6 kg)  LMP 08/12/2016  SpO2 97%  BMI 22.86 kg/m2 Estimated body mass index is 22.86 kg/(m^2) as calculated from the following:    Height as of this encounter: 5' 2.5\" (1.588 m).    Weight as of this encounter: 127 lb (57.6 kg).  Medication Reconciliation: complete   Judy MOYER CMA (AAMA)    "

## 2017-04-11 ENCOUNTER — OFFICE VISIT (OUTPATIENT)
Dept: OTOLARYNGOLOGY | Facility: CLINIC | Age: 42
End: 2017-04-11
Payer: COMMERCIAL

## 2017-04-11 ENCOUNTER — OFFICE VISIT (OUTPATIENT)
Dept: AUDIOLOGY | Facility: CLINIC | Age: 42
End: 2017-04-11
Payer: COMMERCIAL

## 2017-04-11 VITALS
SYSTOLIC BLOOD PRESSURE: 102 MMHG | DIASTOLIC BLOOD PRESSURE: 71 MMHG | HEART RATE: 79 BPM | WEIGHT: 124 LBS | TEMPERATURE: 98.5 F | BODY MASS INDEX: 21.97 KG/M2 | HEIGHT: 63 IN

## 2017-04-11 DIAGNOSIS — H90.3 ASYMMETRICAL SENSORINEURAL HEARING LOSS: ICD-10-CM

## 2017-04-11 DIAGNOSIS — H69.90 DYSFUNCTION OF EUSTACHIAN TUBE, UNSPECIFIED LATERALITY: Primary | ICD-10-CM

## 2017-04-11 DIAGNOSIS — H91.22 SUDDEN HEARING LOSS, LEFT: Primary | ICD-10-CM

## 2017-04-11 PROCEDURE — 99204 OFFICE O/P NEW MOD 45 MIN: CPT | Performed by: OTOLARYNGOLOGY

## 2017-04-11 PROCEDURE — 92567 TYMPANOMETRY: CPT | Performed by: AUDIOLOGIST

## 2017-04-11 PROCEDURE — 92557 COMPREHENSIVE HEARING TEST: CPT | Performed by: AUDIOLOGIST

## 2017-04-11 RX ORDER — PREDNISONE 20 MG/1
60 TABLET ORAL DAILY
Qty: 42 TABLET | Refills: 0 | Status: SHIPPED | OUTPATIENT
Start: 2017-04-11 | End: 2017-04-25

## 2017-04-11 ASSESSMENT — PAIN SCALES - GENERAL: PAINLEVEL: NO PAIN (0)

## 2017-04-11 NOTE — NURSING NOTE
"Initial /71 (BP Location: Right arm, Patient Position: Chair, Cuff Size: Adult Regular)  Pulse 79  Temp 98.5  F (36.9  C) (Oral)  Ht 1.594 m (5' 2.75\")  Wt 56.2 kg (124 lb)  LMP 08/12/2016  BMI 22.14 kg/m2 Estimated body mass index is 22.14 kg/(m^2) as calculated from the following:    Height as of this encounter: 1.594 m (5' 2.75\").    Weight as of this encounter: 56.2 kg (124 lb). .    Rakel Beltran CMA    "

## 2017-04-11 NOTE — PROGRESS NOTES
AUDIOLOGY REPORT    SUBJECTIVE:  Vianey Ryan is a 41 year old female who was seen in the Audiology Clinic at [facility] for an audiologic evaluation, referred by Dr. Shay.  No previous audiograms are available at today's appointment.  The patient reports sudden left ear hearing loss and tinnitus following virus 3 weeks ago. The patient denies tinnitus in the right ear, bilateral otalgia, bilateral drainage, bilateral aural fullness and history of noise exposure.     OBJECTIVE:  Otoscopic exam indicates partial obstruction with cerumen bilaterally    Pure Tone Thresholds assessed using conventional audiometry with good  reliability from 250-8000 Hz bilaterally using circumaural headphones     RIGHT:  normal hearing    LEFT:    mild and moderate sensorineural hearing loss    Tympanogram:    RIGHT: normal eardrum mobility    LEFT:   normal eardrum mobility    Speech Reception Threshold:    RIGHT: 15 dB HL    LEFT:   25 dB HL  Word Recognition Score:     RIGHT: 96% at 55 dB HL using W22 recorded word list.    LEFT:   96% at 65 dB HL using W22 recorded word list.      ASSESSMENT:   Mild to moderate sensorineural hearing loss in the left ear with normal hearing thresholds in the right ear.     Today s results were discussed with the patient in detail.     PLAN: It is recommended that the patient be seen by Dr. Shay for medical evaluation of her ears and hearing loss. Patient was counseled regarding hearing loss and impact on communication.  Please call this clinic with questions regarding these results or recommendations.        TIFFANY Barrera  Clinical audiologist [place] # [id number]  4/11/2017

## 2017-04-11 NOTE — MR AVS SNAPSHOT
After Visit Summary   4/11/2017    Vianey Ryan    MRN: [id number]           Patient Information     Date Of Birth          1975        Visit Information        Provider Department      4/11/2017 9:30 AM Geovany Shay MD [facility]        Today's Diagnoses     Dysfunction of eustachian tube, unspecified laterality    -  1    Asymmetrical sensorineural hearing loss          Care Instructions    Per Physician's instructions          Follow-ups after your visit        Additional Services     AUDIOLOGY ADULT REFERRAL       Your provider has referred you to: [facility]: [facility] [phone]   [email or web address]    Treatment:  Evaluation & Treatment  Specialty Testing:  Audiogram w/Tymps and Reflexes    Please be aware that coverage of these services is subject to the terms and limitations of your health insurance plan.  Call member services at your health plan with any benefit or coverage questions.      Please bring the following to your appointment:  >>   Any x-rays, CTs or MRIs which have been performed.  Contact the facility where they were done to arrange for  prior to your scheduled appointment.  Any new CT, MRI or other procedures ordered by your specialist must be performed at a [facility] facility or coordinated by your clinic's referral office.   >>   List of current medications  >>   This referral request   >>   Any documents/labs given to you for this referral                  Future tests that were ordered for you today     Open Future Orders        Priority Expected Expires Ordered    MR Brain w/o & w Contrast Routine  4/11/2018 4/11/2017            Who to contact     If you have questions or need follow up information about today's clinic visit or your schedule please contact [facility] directly at [phone].  Normal or non-critical lab and imaging results will be communicated to you by MyChart, letter or  "phone within 4 business days after the clinic has received the results. If you do not hear from us within 7 days, please contact the clinic through [employer] or phone. If you have a critical or abnormal lab result, we will notify you by phone as soon as possible.  Submit refill requests through [employer] or call your pharmacy and they will forward the refill request to us. Please allow 3 business days for your refill to be completed.          Additional Information About Your Visit        [employer] Information     [employer] gives you secure access to your electronic health record. If you see a primary care provider, you can also send messages to your care team and make appointments. If you have questions, please call your primary care clinic.  If you do not have a primary care provider, please call [phone] and they will assist you.        Care EveryWhere ID     This is your Care EveryWhere ID. This could be used by other organizations to access your [facility] medical records  [id number]        Your Vitals Were     Pulse Temperature Height Last Period BMI (Body Mass Index)       79 98.5  F (36.9  C) (Oral) 1.594 m (5' 2.75\") 08/12/2016 22.14 kg/m2        Blood Pressure from Last 3 Encounters:   04/11/17 102/71   03/27/17 119/66   03/23/17 120/72    Weight from Last 3 Encounters:   04/11/17 56.2 kg (124 lb)   03/27/17 57.6 kg (127 lb)   03/22/17 56.3 kg (124 lb 3.2 oz)              We Performed the Following     AUDIOLOGY ADULT REFERRAL          Today's Medication Changes          These changes are accurate as of: 4/11/17  9:51 AM.  If you have any questions, ask your nurse or doctor.               Start taking these medicines.        Dose/Directions    predniSONE 20 MG tablet   Commonly known as:  DELTASONE   Used for:  Asymmetrical sensorineural hearing loss   Started by:  Geovany Shay MD        Dose:  60 mg   Take 3 tablets (60 mg) by mouth daily for 14 days   Quantity:  42 tablet   Refills:  0         Stop taking " these medicines if you haven't already. Please contact your care team if you have questions.     benzonatate 200 MG capsule   Commonly known as:  TESSALON   Stopped by:  Geovany Shay MD           fluticasone 50 MCG/ACT spray   Commonly known as:  FLONASE   Stopped by:  Geovany Shay MD           guaiFENesin-codeine 100-10 MG/5ML Soln solution   Commonly known as:  ROBITUSSIN AC   Stopped by:  Geovany Shay MD                Where to get your medicines      These medications were sent to [id number] - [facility] [address] AT [facility] [place]  [address], [facility] [address]     Phone:  [phone]     predniSONE 20 MG tablet                Primary Care Provider Office Phone # Fax #    Jose Hearn [phone]       [facility] [address] [address]        Thank you!     Thank you for choosing [facility]  for your care. Our goal is always to provide you with excellent care. Hearing back from our patients is one way we can continue to improve our services. Please take a few minutes to complete the written survey that you may receive in the mail after your visit with us. Thank you!             Your Updated Medication List - Protect others around you: Learn how to safely use, store and throw away your medicines at www.disposemymeds.org.          This list is accurate as of: 4/11/17  9:51 AM.  Always use your most recent med list.                   Brand Name Dispense Instructions for use    albuterol 108 (90 BASE) MCG/ACT Inhaler    PROAIR HFA/PROVENTIL HFA/VENTOLIN HFA    1 Inhaler    Inhale 2 puffs into the lungs every 6 hours as needed for shortness of breath / dyspnea or wheezing       predniSONE 20 MG tablet    DELTASONE    42 tablet    Take 3 tablets (60 mg) by mouth daily for 14 days

## 2017-04-11 NOTE — MR AVS SNAPSHOT
After Visit Summary   4/11/2017    Vianey Ryan    MRN: [id number]           Patient Information     Date Of Birth          1975        Visit Information        Provider Department      4/11/2017 9:00 AM Мария Velazquez AuD [facility]        Today's Diagnoses     Sudden hearing loss, left    -  1       Follow-ups after your visit        Your next 10 appointments already scheduled     Apr 11, 2017  9:30 AM CDT   New Visit with Geovany Shay MD   [facility] ([facility])    [address] [address]   [phone]              Who to contact     If you have questions or need follow up information about today's clinic visit or your schedule please contact [facility] directly at [phone].  Normal or non-critical lab and imaging results will be communicated to you by MyChart, letter or phone within 4 business days after the clinic has received the results. If you do not hear from us within 7 days, please contact the clinic through MyChart or phone. If you have a critical or abnormal lab result, we will notify you by phone as soon as possible.  Submit refill requests through [employer] ([employer] or call your pharmacy and they will forward the refill request to us. Please allow 3 business days for your refill to be completed.          Additional Information About Your Visit        MyChart Information     [employer] ([employer] gives you secure access to your electronic health record. If you see a primary care provider, you can also send messages to your care team and make appointments. If you have questions, please call your primary care clinic.  If you do not have a primary care provider, please call [phone] and they will assist you.        Care EveryWhere ID     This is your Care EveryWhere ID. This could be used by other organizations to access your [facility] medical records  [id number]        Your Vitals Were     Last Period                    08/12/2016            Blood Pressure from Last 3 Encounters:   03/27/17 119/66   03/23/17 120/72   03/22/17 111/73    Weight from Last 3 Encounters:   03/27/17 127 lb (57.6 kg)   03/22/17 124 lb 3.2 oz (56.3 kg)   10/18/16 124 lb (56.2 kg)              We Performed the Following     AUDIOGRAM/TYMPANOGRAM - INTERFACE     COMPREHENSIVE HEARING TEST     TYMPANOMETRY          Today's Medication Changes          These changes are accurate as of: 4/11/17  9:27 AM.  If you have any questions, ask your nurse or doctor.               Stop taking these medicines if you haven't already. Please contact your care team if you have questions.     benzonatate 200 MG capsule   Commonly known as:  TESSALON   Stopped by:  Geovany Shay MD           fluticasone 50 MCG/ACT spray   Commonly known as:  FLONASE   Stopped by:  Geovany Shay MD           guaiFENesin-codeine 100-10 MG/5ML Soln solution   Commonly known as:  ROBITUSSIN AC   Stopped by:  Geovany Shay MD                    Primary Care Provider Office Phone # Fax #    Jose Hearn [phone]       [facility] [address] [address]        Thank you!     Thank you for choosing [facility]  for your care. Our goal is always to provide you with excellent care. Hearing back from our patients is one way we can continue to improve our services. Please take a few minutes to complete the written survey that you may receive in the mail after your visit with us. Thank you!             Your Updated Medication List - Protect others around you: Learn how to safely use, store and throw away your medicines at www.disposemymeds.org.          This list is accurate as of: 4/11/17  9:27 AM.  Always use your most recent med list.                   Brand Name Dispense Instructions for use    albuterol 108 (90 BASE) MCG/ACT Inhaler    PROAIR HFA/PROVENTIL HFA/VENTOLIN HFA    1 Inhaler    Inhale 2 puffs into the lungs every 6  hours as needed for shortness of breath / dyspnea or wheezing

## 2017-04-13 ENCOUNTER — HOSPITAL ENCOUNTER (OUTPATIENT)
Dept: MRI IMAGING | Facility: CLINIC | Age: 42
Discharge: HOME | End: 2017-04-13
Attending: OTOLARYNGOLOGY | Admitting: OTOLARYNGOLOGY
Payer: COMMERCIAL

## 2017-04-13 DIAGNOSIS — H90.3 ASYMMETRICAL SENSORINEURAL HEARING LOSS: ICD-10-CM

## 2017-04-13 PROCEDURE — 70553 MRI BRAIN STEM W/O & W/DYE: CPT

## 2017-04-13 PROCEDURE — 25500064 ZZH RX 255 OP 636: Performed by: OTOLARYNGOLOGY

## 2017-04-13 PROCEDURE — A9585 GADOBUTROL INJECTION: HCPCS | Performed by: OTOLARYNGOLOGY

## 2017-04-13 RX ORDER — GADOBUTROL 604.72 MG/ML
5 INJECTION INTRAVENOUS ONCE
Status: COMPLETED | OUTPATIENT
Start: 2017-04-13 | End: 2017-04-13

## 2017-04-13 RX ADMIN — GADOBUTROL 5 ML: 604.72 INJECTION INTRAVENOUS at 09:11

## 2017-08-02 ENCOUNTER — OFFICE VISIT (OUTPATIENT)
Dept: DERMATOLOGY | Facility: CLINIC | Age: 42
End: 2017-08-02
Payer: COMMERCIAL

## 2017-08-02 VITALS — DIASTOLIC BLOOD PRESSURE: 72 MMHG | HEART RATE: 75 BPM | OXYGEN SATURATION: 100 % | SYSTOLIC BLOOD PRESSURE: 137 MMHG

## 2017-08-02 DIAGNOSIS — Z85.828 HISTORY OF SKIN CANCER: Primary | ICD-10-CM

## 2017-08-02 DIAGNOSIS — D22.9 NEVUS: ICD-10-CM

## 2017-08-02 DIAGNOSIS — L82.1 SK (SEBORRHEIC KERATOSIS): ICD-10-CM

## 2017-08-02 DIAGNOSIS — D18.00 ANGIOMA: ICD-10-CM

## 2017-08-02 DIAGNOSIS — L81.4 LENTIGO: ICD-10-CM

## 2017-08-02 PROCEDURE — 99203 OFFICE O/P NEW LOW 30 MIN: CPT | Performed by: DERMATOLOGY

## 2017-08-02 NOTE — MR AVS SNAPSHOT
After Visit Summary   8/2/2017    Vianey Ryan    MRN: [id number]           Patient Information     Date Of Birth          1975        Visit Information        Provider Department      8/2/2017 9:30 AM Rigoberto Moe MD [facility]        Today's Diagnoses     History of skin cancer    -  1    Lentigo        SK (seborrheic keratosis)        Angioma        Nevus           Follow-ups after your visit        Who to contact     If you have questions or need follow up information about today's clinic visit or your schedule please contact [facility] directly at [phone].  Normal or non-critical lab and imaging results will be communicated to you by MyChart, letter or phone within 4 business days after the clinic has received the results. If you do not hear from us within 7 days, please contact the clinic through [employer] or phone. If you have a critical or abnormal lab result, we will notify you by phone as soon as possible.  Submit refill requests through [email or web address] or call your pharmacy and they will forward the refill request to us. Please allow 3 business days for your refill to be completed.          Additional Information About Your Visit        MyChart Information     [email or web address] gives you secure access to your electronic health record. If you see a primary care provider, you can also send messages to your care team and make appointments. If you have questions, please call your primary care clinic.  If you do not have a primary care provider, please call [phone] and they will assist you.        Care EveryWhere ID     This is your Care EveryWhere ID. This could be used by other organizations to access your [facility] medical records  [id number]        Your Vitals Were     Pulse Last Period Pulse Oximetry             75 08/12/2016 100%          Blood Pressure from Last 3 Encounters:   08/02/17 137/72   04/11/17 102/71   03/27/17 119/66    Weight from Last 3  Encounters:   04/11/17 56.2 kg (124 lb)   03/27/17 57.6 kg (127 lb)   03/22/17 56.3 kg (124 lb 3.2 oz)              Today, you had the following     No orders found for display       Primary Care Provider Office Phone # Fax #    Jose Hearn [phone]       [facility] [address] [facility] [facility] [address]        Equal Access to Services     CAMERON IRWIN : Hadii aad ku hadasho Soomaali, waaxda luqadaha, qaybta kaalmada adeegyada, waxay idiin hayaan adeeg kharash la'aan . So [facility] [phone].    ATENCIÓN: Si habla español, tiene a rob disposición servicios gratuitos de asistencia lingüística. Ll[facility] [phone].    We comply with applicable federal civil rights laws and [place] laws. We do not discriminate on the basis of race, color, national origin, age, disability sex, sexual orientation or gender identity.            Thank you!     Thank you for choosing [facility]  for your care. Our goal is always to provide you with excellent care. Hearing back from our patients is one way we can continue to improve our services. Please take a few minutes to complete the written survey that you may receive in the mail after your visit with us. Thank you!             Your Updated Medication List - Protect others around you: Learn how to safely use, store and throw away your medicines at www.disposemymeds.org.          This list is accurate as of: 8/2/17  9:57 AM.  Always use your most recent med list.                   Brand Name Dispense Instructions for use Diagnosis    albuterol 108 (90 BASE) MCG/ACT Inhaler    PROAIR HFA/PROVENTIL HFA/VENTOLIN HFA    1 Inhaler    Inhale 2 puffs into the lungs every 6 hours as needed for shortness of breath / dyspnea or wheezing    Bronchitis

## 2017-08-02 NOTE — PROGRESS NOTES
Vianey Ryan is a 42 year old year old female patient here today for hx ofbcc on forehead and chest.  She denies any new or changing skin lesions. Patient reports the following modifying factors none.  Associated symptoms: none.  Patient has no other skin complaints today.  Remainder of the HPI, Meds, PMH, Allergies, FH, and SH was reviewed in chart.    Pertinent Hx:   Basal cell carcinoma   Past Medical History:   Diagnosis Date     Allergy to cats      Anemia 4/3/2015     Basal cell carcinoma      Chickenpox      Depressive disorder, not elsewhere classified     Depression (non-psychotic)     Metrorrhagia 2010     Ovarian cyst 2008     PONV (postoperative nausea and vomiting)      Severe preeclampsia      Thyroid disease      Urinary tract infections        Past Surgical History:   Procedure Laterality Date     C/SECTION, LOW TRANSVERSE  2006    Severe pre-eclampsia      SECTION, TUBAL LIGATION, COMBINED N/A 4/3/2015    Procedure: COMBINED  SECTION, TUBAL LIGATION;  Surgeon: Tay Mosqueda MD;  Location: [facility]     COLONOSCOPY  2013    Procedure: COLONOSCOPY;  Colonoscopy;  Surgeon: Manan Rangel MD;  Location: [facility]     CYSTOSCOPY N/A 2016    Procedure: CYSTOSCOPY;  Surgeon: Tay Mosqueda MD;  Location: [facility]     D & C  8/6/10     HC MOHS HEAD/NCK/HND/FT/GEN 1ST STAGE UP T0 5 BLOCKS  09    Mohs excision right central forehead BCC     HYSTEROSCOPY  8/6/10     LAPAROSCOPIC CHOLECYSTECTOMY N/A 2015    Procedure: LAPAROSCOPIC CHOLECYSTECTOMY;  Surgeon: Landon Schultz MD;  Location: [facility]     LAPAROSCOPIC HYSTERECTOMY TOTAL N/A 2016    Procedure: LAPAROSCOPIC HYSTERECTOMY TOTAL;  Surgeon: Tay Mosqueda MD;  Location: [facility] OR     MOHS MICROGRAPHIC PROCEDURE       TONSILLECTOMY          Family History   Problem Relation Age of Onset     Hypertension Father      Hypertension Paternal Grandmother      Lipids Paternal Grandmother       "Thyroid Disease Paternal Grandmother      Blood Disease Mother      Anemia     DIABETES Mother      \"borderline diabetic\"     Alcohol/Drug Paternal Grandfather      alcohol     CANCER Maternal Grandfather      skin     Alzheimer Disease Maternal Grandmother      Breast Cancer No family hx of        Social History     Social History     Marital status:      Spouse name: Andrea     Number of children: 3     Years of education: N/A     Occupational History     , 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 No     Social History Narrative       Outpatient Encounter Prescriptions as of 8/2/2017   Medication Sig Dispense Refill     albuterol (PROAIR HFA/PROVENTIL HFA/VENTOLIN HFA) 108 (90 BASE) MCG/ACT Inhaler Inhale 2 puffs into the lungs every 6 hours as needed for shortness of breath / dyspnea or wheezing (Patient not taking: Reported on 4/11/2017) 1 Inhaler 0     No facility-administered encounter medications on file as of 8/2/2017.              Review Of Systems  Skin: As above  Eyes: negative  Ears/Nose/Throat: negative  Respiratory: No shortness of breath, dyspnea on exertion, cough, or hemoptysis  Cardiovascular: negative  Gastrointestinal: negative  Genitourinary: negative  Musculoskeletal: negative  Neurologic: negative  Psychiatric: negative  Hematologic/Lymphatic/Immunologic: negative  Endocrine: negative      O:   NAD, WDWN, Alert & Oriented, Mood & Affect wnl, Vitals stable   Here today alone   /72  Pulse 75  LMP 08/12/2016  SpO2 100%   General appearance normal   Vitals stable   Alert, oriented and in no acute distress      Following lymph nodes " palpated: Occipital, Cervical, Supraclavicular no lad   Seborrheic keratosis l   Red papule son salbador    1-2mm pigmented macule swith regula rborders and pigment networks on trunk and ext         The remainder of the full exam was unremarkable; the following areas were examined:  conjunctiva/lids, oral mucosa, neck, peripheral vascular system, abdomen, lymph nodes, digits/nails, eccrine and apocrine glands, scalp/hair, face, neck, chest, abdomen, buttocks, back, RUE, LUE, RLE, LLE       Eyes: Conjunctivae/lids:Normal     ENT: Lips, buccal mucosa, tongue: normal    MSK:Normal    Cardiovascular: peripheral edema none    Pulm: Breathing Normal    Lymph Nodes: No Head and Neck Lymphadenopathy     Neuro/Psych: Orientation:Normal; Mood/Affect:Normal      A/P:  1. Hx of basal cell carcinoma, seborrheic keratosis, lentigo, angioma, nevi  BENIGN LESIONS DISCUSSED WITH PATIENT:  I discussed the specifics of tumor, prognosis, and genetics of benign lesions.  I explained that treatment of these lesions would be purely cosmetic and not medically neccessary.  I discussed with patient different removal options including excision, cautery and /or laser.      Nature and genetics of benign skin lesions dicussed with patient.  Signs and Symptoms of skin cancer discussed with patient.  ABCDEs of melanoma reviewed with patient.  Patient encouraged to perform monthly skin exams.  UV precautions reviewed with patient.  Skin care regimen reviewed with patient: Eliminate harsh soaps, i.e. Dial, zest, irsih spring; Mild soaps such as Cetaphil or Dove sensitive skin, avoid hot or cold showers, aggressive use of emollients including vanicream, cetaphil or cerave discussed with patient.    Risks of non-melanoma skin cancer discussed with patient   Return to clinic 12 months

## 2017-08-02 NOTE — NURSING NOTE
Chief Complaint   Patient presents with     Skin Check     annual skin check       Vitals:    08/02/17 0918   BP: 137/72   Pulse: 75   SpO2: 100%     Wt Readings from Last 1 Encounters:   04/11/17 56.2 kg (124 lb)       Radha Caceres LPN.................8/2/2017

## 2017-08-19 ENCOUNTER — HEALTH MAINTENANCE LETTER (OUTPATIENT)
Age: 42
End: 2017-08-19

## 2017-08-30 ENCOUNTER — OFFICE VISIT (OUTPATIENT)
Dept: ENDOCRINOLOGY | Facility: CLINIC | Age: 42
End: 2017-08-30

## 2017-08-30 VITALS
SYSTOLIC BLOOD PRESSURE: 126 MMHG | WEIGHT: 130.6 LBS | BODY MASS INDEX: 23.14 KG/M2 | DIASTOLIC BLOOD PRESSURE: 69 MMHG | HEART RATE: 81 BPM | HEIGHT: 63 IN

## 2017-08-30 DIAGNOSIS — E05.90 SUBCLINICAL HYPERTHYROIDISM: ICD-10-CM

## 2017-08-30 DIAGNOSIS — E05.90 SUBCLINICAL HYPERTHYROIDISM: Primary | ICD-10-CM

## 2017-08-30 DIAGNOSIS — E04.2 MULTINODULAR GOITER: ICD-10-CM

## 2017-08-30 LAB
T3 SERPL-MCNC: 94 NG/DL (ref 60–181)
T4 FREE SERPL-MCNC: 0.87 NG/DL (ref 0.76–1.46)
TSH SERPL DL<=0.005 MIU/L-ACNC: 0.23 MU/L (ref 0.4–4)

## 2017-08-30 NOTE — NURSING NOTE
"Chief Complaint   Patient presents with     RECHECK     HYPERTHYROIDISM F/U        Initial /69 (BP Location: Right arm, Patient Position: Sitting, Cuff Size: Adult Regular)  Pulse 81  Ht 1.594 m (5' 2.75\")  Wt 59.2 kg (130 lb 9.6 oz)  LMP 08/12/2016  BMI 23.32 kg/m2 Estimated body mass index is 23.32 kg/(m^2) as calculated from the following:    Height as of this encounter: 1.594 m (5' 2.75\").    Weight as of this encounter: 59.2 kg (130 lb 9.6 oz).  Medication Reconciliation: complete    "

## 2017-08-30 NOTE — LETTER
8/30/2017       RE: Vianey Ryan  [address] [facility] [address]     Dear Colleague,    Thank you for referring your patient, Vianey Ryan, to the [facility] ENDOCRINOLOGY at [facility]. Please see a copy of my visit note below.    Diabetes, Endocrinology and Metabolism Clinic         Reason for visit: follow up subclinical hyperthyroidism and multinodular goiter.    History of presenting illness:  Ms. Ryan is a 42 years old female with history of Raynaud's phenomenon, basal cell carcinoma, irritable bowel syndrome who is here for follow up subclinical hyperthyroidism and multinodular goiter. Previously seen in 7/2015.    1) Subclinical hyperthyroidism: She has had abnormal thyroid function tests (low TSH, normal free T4) and significant weight loss after she gave birth to her first child in Jan 2006. This pregnancy was complicated by severe preeclampsia that resolved after delivery. Her thyroid function tests normalized roughly 2.5 years after her first delivery. However, after delivery of her second child in August 2011, she once again developed abnormal thyroid function tests (low TSH, normal free T4). Since then, her TSH has remained slightly depressed (0.22 to 0.39) with normal free T4 levels.     When I saw her in 2013, TFT was normalized. However, in 2014, TSH was slightly suppressed with normal FT4. She was pregnant with her third pregnancy and gave birth in April 2015. Lab on 11/11/2015 showed TSH 0.31, FT4 0.86, total T3 88, TPO antibodies 11, TSI < 1. Repeated on 5/20/2016 showed TSH 0.29, free T4 0.88, total T3 88.    2) multinodular goiter: She also had thyroid ultrasound done in 11/2013 which showed the presence of a multinodular goiter ranged from 5-10 mm. She did have FNA of left #5 and right#2 nodule in 2014. The left #5 was benign but the right #2 showed AUS with negative mutation for gene specific for thyroid cancer. However, she had  "positive TSHR which reported primarily in benign nodule (please see detail in result section)    She reported worsening insomnia, sweating, cold intolerance and loose stool. She she denies any constipation. She reports no neck pain, difficulty swallowing, or changes in her voice. She denied any fevers, chills, chest pain or discomfort, or shortness of breath, altered bowel movement. She sleeps well.     Past Medical History:  Subclinical hyperthyroidism  Multinodular goiter  G3, P3  Severe preeclampsia during 1st pregnancy  Depression  Raynaud's   Basal cell carcinoma, superior Left breast  Basal cell carcinoma, superior Right forehead  Irritable bowel syndrome  Hx of ovarian cyst  Metrorrhagia    Past Surgical History:  Tonsilectomy   section x 2  Mohs procedure  D & C  Colonoscopy     Medications:  Current Outpatient Prescriptions   Medication Sig Dispense Refill     albuterol (PROAIR HFA/PROVENTIL HFA/VENTOLIN HFA) 108 (90 BASE) MCG/ACT Inhaler Inhale 2 puffs into the lungs every 6 hours as needed for shortness of breath / dyspnea or wheezing (Patient not taking: Reported on 2017) 1 Inhaler 0      Allergies:  Allergies   Allergen Reactions     Nkda [No Known Drug Allergies]      Family History:  Family history of Thyroid disease: Graves' disease (paternal grandmother)  History of Thyroid Cancer: none    Colon cancer - father, uncle, grandfather  HTN - maternal/paternal  Lupus - cousin on maternal side  Type I diabetes mellitus - maternal    Patient has 1 sister who is healthy    Social History:  , 2 children (7 and 2), , non-smoker, non-drinker, no illicit drug use    Review of System:  10 point ROS was done and was negative except mention above.     Physical Examination:  Vital signs:   /69 (BP Location: Right arm, Patient Position: Sitting, Cuff Size: Adult Regular)  Pulse 81  Ht 1.594 m (5' 2.75\")  Wt 59.2 kg (130 lb 9.6 oz)  LMP 2016  BMI 23.32 kg/m2  Estimated body " "mass index is 23.32 kg/(m^2) as calculated from the following:    Height as of this encounter: 1.594 m (5' 2.75\").    Weight as of this encounter: 59.2 kg (130 lb 9.6 oz).  Gen: NAD, comfortable  HEENT: Head normal, no lid lag, retraction, no proptosis  NECK: Supple, thyroid is minimally diffusely enlarged. No palpable masses or nodules in the thyroid bed; no cervical or supraclavicular adenopathy noted.  CVS: Regular rate and rhythm, normal S1, S2. No murmurs, rubs, or gallops.  LUNGS: Clear to ascultation bilaterally  GI: Soft, non-tender, normal bowel sounds, no organomegaly.  CNS: Fine tremor in hands, normal patellar reflexes  Extremities: no edema or rashes  PSYCH: Normal mood, pleasant affect    Lab:  4/29/14  CYTOLOGIC INTERPRETATION:    A. Lymph node, right level 3, US-FNA:  - Polymorphous lymphoid elements  - No evidence of malignancy  Specimen Adequacy: Satisfactory for evaluation.    B. Thyroid, right#2, US-FNA:  - Atypia of Undetermined Significance  Specimen Adequacy: Satisfactory for evaluation.    The [place] Implied Risk of Malignancy and Recommended Clinical Management:  Atypia of Undetermined Significance has a 5-15% risk of malignancy, recommended repeat FNA.    The specimen will be submitted for molecular studies    C. Thyroid, left #5, US-FNA: Benign  - Consistent with a benign nodule (includes adenomatoid nodule, colloid nodule, etc.)  Specimen Adequacy: Satisfactory for evaluation.     Next generation sequencing panel for thyroid cancer (ThyroSeq).    RESULTS:  A. Mutations associated with high cancer risk are NOT identified.  B. 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      The tested sample is NEGATIVE for hotspots of the following gene(s):  AKT1, BRAF, CTNNB1, GNAS, HRAS, KRAS, NRAS, PIK3CA, PTEN, RET, TP53,  RET/PTC1, RET/PTC3, PAX8/PPARg    INTERPRETATION:  In this case, no mutations associated with well-established cancer risk were identified. " In addition, the TSHR gene mutation p.D633H was identified with the allelic frequency of 6%, which corresponds to 12% of cells with heterozygous TSHR mutation in the collected sample. Somatic mutations of the thyrotropin receptor (TSHR) gene are known to frequently occur in autonomously functioning thyroid nodules (AFTN)(1-3). Mutations in this particular codon have been reported primarily in benign thyroid nodules and frequently in AFTN (4-7), although single cases of thyroid cancer carrying TSHR mutation in hyperfunctioning nodules have also been reported (3,8). In our experience, rare nodules with TSHR mutations found to be follicular cancers after excision had TSHR mutation present at high level, i.e., with allelic frequency of >30%. Therefore, based on the current knowledge, the results of molecular testing are consistent with a BENIGN nodule; however, these results should be interpreted with caution and in correlation with cytological, imaging, and other clinical and laboratory data.    COMMENT:    Complete report has been scanned into the patient's electronic medical record.    Imaging:  US thyroid 4/21/14  FINDINGS: The right lobe of the thyroid gland measures 5.7 x 1.7 x 1.6 cm. The left lobe of the thyroid gland measures 5.3 x 1.4 x 1.6 cm.  The isthmus measures 0.4 cm.    There are three focal findings in the right lobe. There is a predominantly cystic lesion in the midpole region that measures 0.9 x 0.5 x 0.6 cm. There is a solid hypoechoic nodule in the lower pole  that measures 1.0 x 0.5 a 0.8 cm. There is a complex cystic and solid nodule in the lower pole that measures 0.7 x 0.4 x 0.7 cm. These  findings have not significantly changed in the interim.     There are 4 focal findings in the left lobe. There is a solid hypoechoic nodule in the upper pole that measure 0.7 x 0.3 x 0.6 cm. There is another solid hypoechoic nodule in the upper pole that  measures 0.7 x 0.6 x 0.9 cm. This may have a few tiny  punctate calcifications within. There is another hypoechoic solid nodule in the lower pole that measure 0.5 x 0.3 x 0.6 cm. Finally, there is a  predominantly solid nodule in the lower pole that measures 0.9 x 0.5 x 0.8 cm.     There is a right level III lymph node present that measures 0.4 x 2.2 cm. There is some vague hyper echogenicity within. There is a left level II lymph node that measures 1.7 x 0.5 cm. There is some vague  hyperechogenicity centrally which may represent partial fatty hilum.    IMPRESSION: The nodules within the thyroid gland have not significantly changed in size in the interim. There is however a borderline in size nodule in the upper pole the left lobe for which punctate the microcalcifications are evident. Ultrasound guided FNA is suggested in further assessment.    JUAN FRANCISCO CEDILLO MD    Assessment:  Ms. Ryan is a 42 year old female with a history of subclinical hyperthyroidism and multinodular goiter presents today for follow up.     1) Subclinical hyperthyroidism: her TSH was fluctuated and mildly suppressed previously. TSI was negative. Differential Dx includes seronegative Graves' disease vs. ??toxic multinodular goiter. She is clinically euthyroid. I will repeat lab today.    2) multiple thyroid nodules: she has had multiple nodules that was biopsied in 4/2014. The left #5 was benign but the right #2 was AUS with TSHR mutation which is not commonly associated with cancer. She has no compressive symptoms. I will follow up US thyroid this year.    Plan:   - follow up TSH, FT4, TT3 today  - follow up US thyroid    Ronald Aguilera MD  Endocrinology  [phone]

## 2017-08-30 NOTE — PATIENT INSTRUCTIONS
To expedite your medication refill(s), please contact your pharmacy and have them fax a refill request to: [phone].  *Please allow 3 business days for routine medication refills.  *Please allow 5 business days for controlled substance medication refills.  --------------------  For scheduling appointments (including lab work), please request an appointment through [employer], or call: [phone].    For questions for your provider or the endocrine nurse, please send a [employer] message.  For after-hours urgent issues, please dial [phone], and ask to speak with the Endocrinologist On-Call.  --------------------  Please Note: If you are active on [employer], all future test results will be sent by [employer] message only and will no longer be sent by mail. You may also receive communication directly from your physician.

## 2017-08-30 NOTE — MR AVS SNAPSHOT
After Visit Summary   8/30/2017    Vianey Ryan    MRN: [id number]           Patient Information     Date Of Birth          1975        Visit Information        Provider Department      8/30/2017 2:20 PM Ronald Aguilera MD [facility] Endocrinology        Today's Diagnoses     Subclinical hyperthyroidism    -  1    Multinodular goiter          Care Instructions    To expedite your medication refill(s), please contact your pharmacy and have them fax a refill request to: [phone].  *Please allow 3 business days for routine medication refills.  *Please allow 5 business days for controlled substance medication refills.  --------------------  For scheduling appointments (including lab work), please request an appointment through [employer], or call: [phone].    For questions for your provider or the endocrine nurse, please send a HSTYLE message.  For after-hours urgent issues, please dial [phone], and ask to speak with the Endocrinologist On-Call.  --------------------  Please Note: If you are active on [employer], all future test results will be sent by H[employer]YLE message only and will no longer be sent by mail. You may also receive communication directly from your physician.            Follow-ups after your visit        Your next 10 appointments already scheduled     Aug 30, 2017  3:00 PM CDT   LAB with [facility] ([facility] [[email or web address] and [facility] [place])    [address] [place] [id number] [phone]           Patient must bring picture ID. Patient should be prepared to give a urine specimen  Please do not eat 10-12 hours before your appointment if you are coming in fasting for labs on lipids, cholesterol, or glucose (sugar). Pregnant women should follow their Care Team instructions. Water with medications is okay. Do not drink coffee or other fluids. If you have concerns about taking  your medications, please ask at office or if scheduling via  [employer]Scape, send a message by clicking on Secure Messaging, Message Your Care Team.            Sep 05, 2017  8:00 AM CDT   US THYROID with [email or web address]   [facility] ([facility])    [address] [facility] [id number] [phone]           Please bring a list of your medicines (including vitamins, minerals and over-the-counter drugs). Also, tell your doctor about any allergies you may have. Wear comfortable clothes and leave your valuables at home.  You do not need to do anything special to prepare for your exam.  Please call the Imaging Department at your exam site with any questions.              Future tests that were ordered for you today     Open Future Orders        Priority Expected Expires Ordered    US Thyroid Routine  3/28/2018 8/30/2017    T4 free Routine 8/30/2017 8/30/2018 8/30/2017    TSH Routine 8/30/2017 8/30/2018 8/30/2017    T3 total Routine 8/30/2017 8/30/2018 8/30/2017            Who to contact     Please call your clinic at [phone] to:    Ask questions about your health    Make or cancel appointments    Discuss your medicines    Learn about your test results    Speak to your doctor   If you have compliments or concerns about an experience at your clinic, or if you wish to file a complaint, please contact [facility] Patient Relations at [phone] or email us at [email or web address]         Additional Information About Your Visit        PlayScape Information     PlayScape gives you secure access to your electronic health record. If you see a primary care provider, you can also send messages to your care team and make appointments. If you have questions, please call your primary care clinic.  If you do not have a primary care provider, please call [phone] and they will assist you.      PlayScape is an electronic gateway that provides easy, online access to your medical records. With PlayScape, you can request a clinic  "appointment, read your test results, renew a prescription or communicate with your care team.     To access your existing account, please contact your [facility] or call [phone] for assistance.        Care EveryWhere ID     This is your Care EveryWhere ID. This could be used by other organizations to access your [place] medical records  [id number]        Your Vitals Were     Pulse Height Last Period BMI (Body Mass Index)          81 1.594 m (5' 2.75\") 08/12/2016 23.32 kg/m2         Blood Pressure from Last 3 Encounters:   08/30/17 126/69   08/02/17 137/72   04/11/17 102/71    Weight from Last 3 Encounters:   08/30/17 59.2 kg (130 lb 9.6 oz)   04/11/17 56.2 kg (124 lb)   03/27/17 57.6 kg (127 lb)                 Today's Medication Changes          These changes are accurate as of: 8/30/17  2:40 PM.  If you have any questions, ask your nurse or doctor.               Stop taking these medicines if you haven't already. Please contact your care team if you have questions.     albuterol 108 (90 BASE) MCG/ACT Inhaler   Commonly known as:  PROAIR HFA/PROVENTIL HFA/VENTOLIN HFA   Stopped by:  Ronald Aguilera MD                    Primary Care Provider Office Phone # Fax #    Jose Hearn [phone] [address] [address]        Equal Access to Services     CAMERON IRWIN : To lemos Socici, waaxda luqadaha, qaybta kaalmaleonel manning, ale roger. So [facility] [phone].    ATENCIÓN: Si habla español, tiene a rob disposición servicios gratuitos de asistencia lingüística. Llame al [phone].    We comply with applicable federal civil rights laws and [place] laws. We do not discriminate on the basis of race, color, national origin, age, disability sex, sexual orientation or gender identity.            Thank you!     Thank you for choosing [facility]  for your care. Our goal is always " to provide you with excellent care. Hearing back from our patients is one way we can continue to improve our services. Please take a few minutes to complete the written survey that you may receive in the mail after your visit with us. Thank you!             Your Updated Medication List - Protect others around you: Learn how to safely use, store and throw away your medicines at www.disposemymeds.org.      Notice  As of 8/30/2017  2:40 PM    You have not been prescribed any medications.

## 2017-08-30 NOTE — PROGRESS NOTES
[facility]         Reason for visit: follow up subclinical hyperthyroidism and multinodular goiter.    History of presenting illness:  Ms. Ryan is a 42 years old female with history of Raynaud's phenomenon, basal cell carcinoma, irritable bowel syndrome who is here for follow up subclinical hyperthyroidism and multinodular goiter. Previously seen in 7/2015.    1) Subclinical hyperthyroidism: She has had abnormal thyroid function tests (low TSH, normal free T4) and significant weight loss after she gave birth to her first child in Jan 2006. This pregnancy was complicated by severe preeclampsia that resolved after delivery. Her thyroid function tests normalized roughly 2.5 years after her first delivery. However, after delivery of her second child in August 2011, she once again developed abnormal thyroid function tests (low TSH, normal free T4). Since then, her TSH has remained slightly depressed (0.22 to 0.39) with normal free T4 levels.     When I saw her in 2013, TFT was normalized. However, in 2014, TSH was slightly suppressed with normal FT4. She was pregnant with her third pregnancy and gave birth in April 2015. Lab on 11/11/2015 showed TSH 0.31, FT4 0.86, total T3 88, TPO antibodies 11, TSI < 1. Repeated on 5/20/2016 showed TSH 0.29, free T4 0.88, total T3 88.    2) multinodular goiter: She also had thyroid ultrasound done in 11/2013 which showed the presence of a multinodular goiter ranged from 5-10 mm. She did have FNA of left #5 and right#2 nodule in 2014. The left #5 was benign but the right #2 showed AUS with negative mutation for gene specific for thyroid cancer. However, she had positive TSHR which reported primarily in benign nodule (please see detail in result section)    She reported worsening insomnia, sweating, cold intolerance and loose stool. She she denies any constipation. She reports no neck pain, difficulty swallowing, or changes in her voice. She denied  "any fevers, chills, chest pain or discomfort, or shortness of breath, altered bowel movement. She sleeps well.     Past Medical History:  Subclinical hyperthyroidism  Multinodular goiter  G3, P3  Severe preeclampsia during 1st pregnancy  Depression  Raynaud's   Basal cell carcinoma, superior Left breast  Basal cell carcinoma, superior Right forehead  Irritable bowel syndrome  Hx of ovarian cyst  Metrorrhagia    Past Surgical History:  Tonsilectomy   section x 2  Mohs procedure  D & C  Colonoscopy     Medications:  Current Outpatient Prescriptions   Medication Sig Dispense Refill     albuterol (PROAIR HFA/PROVENTIL HFA/VENTOLIN HFA) 108 (90 BASE) MCG/ACT Inhaler Inhale 2 puffs into the lungs every 6 hours as needed for shortness of breath / dyspnea or wheezing (Patient not taking: Reported on 2017) 1 Inhaler 0      Allergies:  Allergies   Allergen Reactions     Nkda [No Known Drug Allergies]      Family History:  Family history of Thyroid disease: Graves' disease (paternal grandmother)  History of Thyroid Cancer: none    Colon cancer - father, uncle, grandfather  HTN - maternal/paternal  Lupus - cousin on maternal side  Type I diabetes mellitus - maternal    Patient has 1 sister who is healthy    Social History:  , 2 children (7 and 2), , non-smoker, non-drinker, no illicit drug use    Review of System:  10 point ROS was done and was negative except mention above.     Physical Examination:  Vital signs:   /69 (BP Location: Right arm, Patient Position: Sitting, Cuff Size: Adult Regular)  Pulse 81  Ht 1.594 m (5' 2.75\")  Wt 59.2 kg (130 lb 9.6 oz)  LMP 2016  BMI 23.32 kg/m2  Estimated body mass index is 23.32 kg/(m^2) as calculated from the following:    Height as of this encounter: 1.594 m (5' 2.75\").    Weight as of this encounter: 59.2 kg (130 lb 9.6 oz).  Gen: NAD, comfortable  HEENT: Head normal, no lid lag, retraction, no proptosis  NECK: Supple, thyroid is minimally " diffusely enlarged. No palpable masses or nodules in the thyroid bed; no cervical or supraclavicular adenopathy noted.  CVS: Regular rate and rhythm, normal S1, S2. No murmurs, rubs, or gallops.  LUNGS: Clear to ascultation bilaterally  GI: Soft, non-tender, normal bowel sounds, no organomegaly.  CNS: Fine tremor in hands, normal patellar reflexes  Extremities: no edema or rashes  PSYCH: Normal mood, pleasant affect    Lab:  4/29/14  CYTOLOGIC INTERPRETATION:    A. Lymph node, right level 3, US-FNA:  - Polymorphous lymphoid elements  - No evidence of malignancy  Specimen Adequacy: Satisfactory for evaluation.    B. Thyroid, right#2, US-FNA:  - Atypia of Undetermined Significance  Specimen Adequacy: Satisfactory for evaluation.    The Secor Implied Risk of Malignancy and Recommended Clinical Management:  Atypia of Undetermined Significance has a 5-15% risk of malignancy, recommended repeat FNA.    The specimen will be submitted for molecular studies    C. Thyroid, left #5, US-FNA: Benign  - Consistent with a benign nodule (includes adenomatoid nodule, colloid nodule, etc.)  Specimen Adequacy: Satisfactory for evaluation.     Next generation sequencing panel for thyroid cancer (ThyroSeq).    RESULTS:  A. Mutations associated with high cancer risk are NOT identified.  B. 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      The tested sample is NEGATIVE for hotspots of the following gene(s):  AKT1, BRAF, CTNNB1, GNAS, HRAS, KRAS, NRAS, PIK3CA, PTEN, RET, TP53,  RET/PTC1, RET/PTC3, PAX8/PPARg    INTERPRETATION:  In this case, no mutations associated with well-established cancer risk were identified. In addition, the TSHR gene mutation p.D633H was identified with the allelic frequency of 6%, which corresponds to 12% of cells with heterozygous TSHR mutation in the collected sample. Somatic mutations of the thyrotropin receptor (TSHR) gene are known to frequently occur in autonomously  functioning thyroid nodules (AFTN)(1-3). Mutations in this particular codon have been reported primarily in benign thyroid nodules and frequently in AFTN (4-7), although single cases of thyroid cancer carrying TSHR mutation in hyperfunctioning nodules have also been reported (3,8). In our experience, rare nodules with TSHR mutations found to be follicular cancers after excision had TSHR mutation present at high level, i.e., with allelic frequency of >30%. Therefore, based on the current knowledge, the results of molecular testing are consistent with a BENIGN nodule; however, these results should be interpreted with caution and in correlation with cytological, imaging, and other clinical and laboratory data.    COMMENT:    Complete report has been scanned into the patient's electronic medical record.    Imaging:  US thyroid 4/21/14  FINDINGS: The right lobe of the thyroid gland measures 5.7 x 1.7 x 1.6 cm. The left lobe of the thyroid gland measures 5.3 x 1.4 x 1.6 cm.  The isthmus measures 0.4 cm.    There are three focal findings in the right lobe. There is a predominantly cystic lesion in the midpole region that measures 0.9 x 0.5 x 0.6 cm. There is a solid hypoechoic nodule in the lower pole  that measures 1.0 x 0.5 a 0.8 cm. There is a complex cystic and solid nodule in the lower pole that measures 0.7 x 0.4 x 0.7 cm. These  findings have not significantly changed in the interim.     There are 4 focal findings in the left lobe. There is a solid hypoechoic nodule in the upper pole that measure 0.7 x 0.3 x 0.6 cm. There is another solid hypoechoic nodule in the upper pole that  measures 0.7 x 0.6 x 0.9 cm. This may have a few tiny punctate calcifications within. There is another hypoechoic solid nodule in the lower pole that measure 0.5 x 0.3 x 0.6 cm. Finally, there is a  predominantly solid nodule in the lower pole that measures 0.9 x 0.5 x 0.8 cm.     There is a right level III lymph node present that measures  0.4 x 2.2 cm. There is some vague hyper echogenicity within. There is a left level II lymph node that measures 1.7 x 0.5 cm. There is some vague  hyperechogenicity centrally which may represent partial fatty hilum.    IMPRESSION: The nodules within the thyroid gland have not significantly changed in size in the interim. There is however a borderline in size nodule in the upper pole the left lobe for which punctate the microcalcifications are evident. Ultrasound guided FNA is suggested in further assessment.    JUAN FRANCISCO CEDILLO MD    Assessment:  Ms. Ryan is a 42 year old female with a history of subclinical hyperthyroidism and multinodular goiter presents today for follow up.     1) Subclinical hyperthyroidism: her TSH was fluctuated and mildly suppressed previously. TSI was negative. Differential Dx includes seronegative Graves' disease vs. ??toxic multinodular goiter. She is clinically euthyroid. I will repeat lab today.    2) multiple thyroid nodules: she has had multiple nodules that was biopsied in 4/2014. The left #5 was benign but the right #2 was AUS with TSHR mutation which is not commonly associated with cancer. She has no compressive symptoms. I will follow up US thyroid this year.    Plan:   - follow up TSH, FT4, TT3 today  - follow up US thyroid    Ronald Aguilera MD  Endocrinology  [phone]

## 2017-09-05 ENCOUNTER — HOSPITAL ENCOUNTER (OUTPATIENT)
Dept: ULTRASOUND IMAGING | Facility: CLINIC | Age: 42
Discharge: HOME | End: 2017-09-05
Attending: INTERNAL MEDICINE | Admitting: INTERNAL MEDICINE
Payer: COMMERCIAL

## 2017-09-05 DIAGNOSIS — E04.2 MULTINODULAR GOITER: ICD-10-CM

## 2017-09-05 PROCEDURE — 76536 US EXAM OF HEAD AND NECK: CPT

## 2017-09-20 ENCOUNTER — OFFICE VISIT (OUTPATIENT)
Dept: OBGYN | Facility: CLINIC | Age: 42
End: 2017-09-20
Payer: COMMERCIAL

## 2017-09-20 VITALS
DIASTOLIC BLOOD PRESSURE: 67 MMHG | HEART RATE: 81 BPM | HEIGHT: 63 IN | WEIGHT: 128.6 LBS | SYSTOLIC BLOOD PRESSURE: 117 MMHG | BODY MASS INDEX: 22.79 KG/M2

## 2017-09-20 DIAGNOSIS — Z80.3 FAMILY HISTORY OF MALIGNANT NEOPLASM OF BREAST: ICD-10-CM

## 2017-09-20 DIAGNOSIS — Z01.419 ENCOUNTER FOR GYNECOLOGICAL EXAMINATION WITHOUT ABNORMAL FINDING: Primary | ICD-10-CM

## 2017-09-20 PROCEDURE — 99396 PREV VISIT EST AGE 40-64: CPT | Performed by: OBSTETRICS & GYNECOLOGY

## 2017-09-20 NOTE — PROGRESS NOTES
SUBJECTIVE:   CC: Vianey Ryan is an 42 year old woman who presents for preventive health visit.     Healthy Habits:    Do you get at least three servings of calcium containing foods daily (dairy, green leafy vegetables, etc.)? yes    Amount of exercise or daily activities, outside of work: 5 day(s) per week    Problems taking medications regularly No    Medication side effects: No    Have you had an eye exam in the past two years? yes    Do you see a dentist twice per year? yes    Do you have sleep apnea, excessive snoring or daytime drowsiness?no          Mother just got diagnosed with breast cancer- mammogram ordered    Today's PHQ-2 Score: PHQ-2 (  [employer]) 2017 3/22/2017   Q1: Little interest or pleasure in doing things 0 0   Q2: Feeling down, depressed or hopeless 0 0   PHQ-2 Score 0 0         Abuse: Current or Past(Physical, Sexual or Emotional)- No  Do you feel safe in your environment - Yes  Social History   Substance Use Topics     Smoking status: Never Smoker     Smokeless tobacco: Never Used     Alcohol use No     The patient does not drink >3 drinks per day nor >7 drinks per week.    Reviewed orders with patient.  Reviewed health maintenance and updated orders accordingly - Yes  BP Readings from Last 3 Encounters:   17 117/67   17 126/69   17 137/72    Wt Readings from Last 3 Encounters:   17 128 lb 9.6 oz (58.3 kg)   17 130 lb 9.6 oz (59.2 kg)   17 124 lb (56.2 kg)                  Patient Active Problem List   Diagnosis     Basal cell carcinoma of forehead     IBS (irritable bowel syndrome)     CARDIOVASCULAR SCREENING; LDL GOAL LESS THAN 160     Abnormal thyroid function test     health care home     Goiter     Hyperthyroidism     Past Surgical History:   Procedure Laterality Date     C/SECTION, LOW TRANSVERSE  2006    Severe pre-eclampsia      SECTION, TUBAL LIGATION, COMBINED N/A 4/3/2015    Procedure: COMBINED  SECTION, TUBAL  "LIGATION;  Surgeon: Tay Mosqueda MD;  Location: [facility]     COLONOSCOPY  9/9/2013    Procedure: COLONOSCOPY;  Colonoscopy;  Surgeon: Manan Rangel MD;  Location: [facility]     CYSTOSCOPY N/A 8/29/2016    Procedure: CYSTOSCOPY;  Surgeon: Tay Mosqueda MD;  Location: [facility]     D & C  8/6/10     HC MOHS HEAD/NCK/HND/FT/GEN 1ST STAGE UP T0 5 BLOCKS  4/14/09    Mohs excision right central forehead BCC     HYSTERECTOMY, PAP NO LONGER INDICATED  08/29/2016     HYSTEROSCOPY  8/6/10     LAPAROSCOPIC CHOLECYSTECTOMY N/A 9/29/2015    Procedure: LAPAROSCOPIC CHOLECYSTECTOMY;  Surgeon: Landon Schultz MD;  Location: [facility]     LAPAROSCOPIC HYSTERECTOMY TOTAL N/A 8/29/2016    Procedure: LAPAROSCOPIC HYSTERECTOMY TOTAL;  Surgeon: Tay Mosqueda MD;  Location: [facility]     MOHS MICROGRAPHIC PROCEDURE       TONSILLECTOMY         Social History   Substance Use Topics     Smoking status: Never Smoker     Smokeless tobacco: Never Used     Alcohol use No     Family History   Problem Relation Age of Onset     Hypertension Father      Hypertension Paternal Grandmother      Lipids Paternal Grandmother      Thyroid Disease Paternal Grandmother      Blood Disease Mother      Anemia     DIABETES Mother      \"borderline diabetic\"     Breast Cancer Mother 63     triple negative stage 2A     Alcohol/Drug Paternal Grandfather      alcohol     CANCER Maternal Grandfather      skin     Alzheimer Disease Maternal Grandmother          No current outpatient prescriptions on file.     Allergies   Allergen Reactions     Nkda [No Known Drug Allergies]            Alternate mammogram schedule due to family  breast cancer history     Pertinent mammograms are reviewed under the imaging tab.  History of abnormal Pap smear: Status post benign hysterectomy. Health Maintenance and Surgical History updated.    Reviewed and updated as needed this visit by clinical staffTobacco  Allergies  Meds  Med Hx  Surg Hx  Fam Hx  Soc Hx    "     Reviewed and updated as needed this visit by Provider        Past Medical History:   Diagnosis Date     Allergy to cats      Anemia 4/3/2015     Basal cell carcinoma      Chickenpox      Depressive disorder, not elsewhere classified     Depression (non-psychotic)     Metrorrhagia 2010     Ovarian cyst 2008     PONV (postoperative nausea and vomiting)      Severe preeclampsia      Thyroid disease      Urinary tract infections       Past Surgical History:   Procedure Laterality Date     C/SECTION, LOW TRANSVERSE  2006    Severe pre-eclampsia      SECTION, TUBAL LIGATION, COMBINED N/A 4/3/2015    Procedure: COMBINED  SECTION, TUBAL LIGATION;  Surgeon: Tay Mosqueda MD;  Location: [facility]     COLONOSCOPY  2013    Procedure: COLONOSCOPY;  Colonoscopy;  Surgeon: Manan Rangel MD;  Location: [facility]     CYSTOSCOPY N/A 2016    Procedure: CYSTOSCOPY;  Surgeon: Tay Mosqueda MD;  Location: [facility]     D & C  8/6/10     HC MOHS HEAD/NCK/HND/FT/GEN 1ST STAGE UP T0 5 BLOCKS  09    Mohs excision right central forehead BCC     HYSTERECTOMY, PAP NO LONGER INDICATED  2016     HYSTEROSCOPY  8/6/10     LAPAROSCOPIC CHOLECYSTECTOMY N/A 2015    Procedure: LAPAROSCOPIC CHOLECYSTECTOMY;  Surgeon: Landon Schultz MD;  Location: [facility]     LAPAROSCOPIC HYSTERECTOMY TOTAL N/A 2016    Procedure: LAPAROSCOPIC HYSTERECTOMY TOTAL;  Surgeon: Tay Mosqueda MD;  Location: [facility]     MOHS MICROGRAPHIC PROCEDURE       TONSILLECTOMY       Obstetric History       T2      L3     SAB0   TAB0   Ectopic0   Multiple0   Live Births3       # Outcome Date GA Lbr Fredy/2nd Weight Sex Delivery Anes PTL Lv   4 Term 04/03/15 37w5d  7 lb 9 oz (3.43 kg) M CS-LTranv Spinal N NEERAJ      Name: Fausto      Apgar1:  8                Apgar5: 8   3 Term 11 39w0d  7 lb 11 oz (3.487 kg) F CS-Unspec   ENERAJ      Name: Teresa      Apgar1:  9                Apgar5: 9   2  " 06 35w4d 03:00 7 lb (3.175 kg) F CS   NEERAJ      Name: Cindy       Apgar1:  8                Apgar5: 9   1 SAB 04 6w0d    SAB   DEC          ROS:  C: NEGATIVE for fever, chills, change in weight  I: NEGATIVE for worrisome rashes, moles or lesions  E: NEGATIVE for vision changes or irritation  ENT: NEGATIVE for ear, mouth and throat problems  R: NEGATIVE for significant cough or SOB  B: NEGATIVE for masses, tenderness or discharge  CV: NEGATIVE for chest pain, palpitations or peripheral edema  GI: NEGATIVE for nausea, abdominal pain, heartburn, or change in bowel habits  : NEGATIVE for unusual urinary or vaginal symptoms. Periods are regular.  M: NEGATIVE for significant arthralgias or myalgia  N: NEGATIVE for weakness, dizziness or paresthesias  E: NEGATIVE for temperature intolerance, skin/hair changes  H: NEGATIVE for bleeding problems  P: NEGATIVE for changes in mood or affect    OBJECTIVE:   /67 (BP Location: Right arm, Cuff Size: Adult Regular)  Pulse 81  Ht 5' 2.5\" (1.588 m)  Wt 128 lb 9.6 oz (58.3 kg)  LMP 2016  BMI 23.15 kg/m2  EXAM:  GENERAL: healthy, alert and no distress  EYES: Eyes grossly normal to inspection, PERRL and conjunctivae and sclerae normal  NECK: no adenopathy, no asymmetry, masses, or scars and thyroid normal to palpation  RESP: lungs clear to auscultation - no rales, rhonchi or wheezes  BREAST: normal without masses, tenderness or nipple discharge and no palpable axillary masses or adenopathy  CV: regular rate and rhythm, normal S1 S2, no S3 or S4, no murmur, click or rub, no peripheral edema and peripheral pulses strong  ABDOMEN: soft, nontender, no hepatosplenomegaly, no masses and bowel sounds normal   (female): normal female external genitalia, normal urethral meatus , vaginal mucosa pink, moist, well rugated and normal post-hysterectomy exam without masses.   MS: no gross musculoskeletal defects noted, no edema  SKIN: no suspicious lesions or " "rashes  NEURO: Normal strength and tone, mentation intact and speech normal  PSYCH: mentation appears normal, affect normal/bright  LYMPH: no cervical, supraclavicular, axillary, or inguinal adenopathy    ASSESSMENT/PLAN:   1. Encounter for gynecological examination without abnormal finding  New family history Triple negative 2A Breast cancer  - *MA Screening Digital Bilateral; Future    COUNSELING:   Reviewed preventive health counseling, as reflected in patient instructions       Regular exercise       Healthy diet/nutrition       Braca testing after finding out more about her mother's situation         reports that she has never smoked. She has never used smokeless tobacco.    Estimated body mass index is 23.15 kg/(m^2) as calculated from the following:    Height as of this encounter: 5' 2.5\" (1.588 m).    Weight as of this encounter: 128 lb 9.6 oz (58.3 kg).         Counseling Resources:  ATP IV Guidelines  Pooled Cohorts Equation Calculator  Breast Cancer Risk Calculator  FRAX Risk Assessment  ICSI Preventive Guidelines  Dietary Guidelines for Americans, 2010  USDA's MyPlate  ASA Prophylaxis  Lung CA Screening    Tay Mosqueda MD  [facility]  "

## 2017-09-20 NOTE — NURSING NOTE
"Initial /67 (BP Location: Right arm, Cuff Size: Adult Regular)  Pulse 81  Ht 5' 2.5\" (1.588 m)  Wt 128 lb 9.6 oz (58.3 kg)  LMP 08/12/2016  BMI 23.15 kg/m2 Estimated body mass index is 23.15 kg/(m^2) as calculated from the following:    Height as of this encounter: 5' 2.5\" (1.588 m).    Weight as of this encounter: 128 lb 9.6 oz (58.3 kg). .      "

## 2017-09-20 NOTE — MR AVS SNAPSHOT
After Visit Summary   9/20/2017    Vianey Ryan    MRN: [id number]           Patient Information     Date Of Birth          1975        Visit Information        Provider Department      9/20/2017 9:30 AM Tay Mosqueda MD [facility]        Today's Diagnoses     Encounter for gynecological examination without abnormal finding    -  1    Family history of malignant neoplasm of breast          Care Instructions      Preventive Health Recommendations  Female Ages 40 to 49    Yearly exam:     See your health care provider every year in order to  1. Review health changes.   2. Discuss preventive care.    3. Review your medicines if your doctor prescribed any.      Get a Pap test every three years (unless you have an abnormal result and your provider advises testing more often).      If you get Pap tests with HPV test, you only need to test every 5 years, unless you have an abnormal result. You do not need a Pap test if your uterus was removed (hysterectomy) and you have not had cancer.      You should be tested each year for STDs (sexually transmitted diseases), if you're at risk.       Ask your doctor if you should have a mammogram.      Have a colonoscopy (test for colon cancer) if someone in your family has had colon cancer or polyps before age 50.       Have a cholesterol test every 5 years.       Have a diabetes test (fasting glucose) after age 45. If you are at risk for diabetes, you should have this test every 3 years.    Shots: Get a flu shot each year. Get a tetanus shot every 10 years.     Nutrition:     Eat at least 5 servings of fruits and vegetables each day.    Eat whole-grain bread, whole-wheat pasta and brown rice instead of white grains and rice.    Talk to your provider about Calcium and Vitamin D.     Lifestyle    Exercise at least 150 minutes a week (an average of 30 minutes a day, 5 days a week). This will help you control your weight and prevent  "disease.    Limit alcohol to one drink per day.    No smoking.     Wear sunscreen to prevent skin cancer.    See your dentist every six months for an exam and cleaning.          Follow-ups after your visit        Future tests that were ordered for you today     Open Future Orders        Priority Expected Expires Ordered    *MA Screening Digital Bilateral Routine  9/20/2018 9/20/2017            Who to contact     If you have questions or need follow up information about today's clinic visit or your schedule please contact [facility] directly at [phone].  Normal or non-critical lab and imaging results will be communicated to you by P[employer]art, letter or phone within 4 business days after the clinic has received the results. If you do not hear from us within 7 days, please contact the clinic through [employer] or phone. If you have a critical or abnormal lab result, we will notify you by phone as soon as possible.  Submit refill requests through [employer] or call your pharmacy and they will forward the refill request to us. Please allow 3 business days for your refill to be completed.          Additional Information About Your Visit        P[employer]CONNECTharRPO Information     [employer] gives you secure access to your electronic health record. If you see a primary care provider, you can also send messages to your care team and make appointments. If you have questions, please call your primary care clinic.  If you do not have a primary care provider, please call [phone] and they will assist you.        Care EveryWhere ID     This is your Care EveryWhere ID. This could be used by other organizations to access your [facility] medical records  [id number]        Your Vitals Were     Pulse Height Last Period BMI (Body Mass Index)          81 5' 2.5\" (1.588 m) 08/12/2016 23.15 kg/m2         Blood Pressure from Last 3 Encounters:   09/20/17 117/67   08/30/17 126/69   08/02/17 137/72    Weight from Last 3 Encounters:   09/20/17 " 128 lb 9.6 oz (58.3 kg)   08/30/17 130 lb 9.6 oz (59.2 kg)   04/11/17 124 lb (56.2 kg)               Primary Care Provider Office Phone # Fax #    Jose Hearn [phone] [address] [facility] [address]        Equal Access to Services     CAMERON IRWIN : Hadii aad ku hadasho Soomaali, waaxda luqadaha, qaybta kaalmada adeegyada, waxay idiin hayaan adeeg kharash la'aan ah. So [facility] [phone].    ATENCIÓN: Si habla español, tiene a rob disposición servicios gratuitos de asistencia lingüística. Llame al [phone].    We comply with applicable federal civil rights laws and [place] laws. We do not discriminate on the basis of race, color, national origin, age, disability sex, sexual orientation or gender identity.            Thank you!     Thank you for choosing [facility]  for your care. Our goal is always to provide you with excellent care. Hearing back from our patients is one way we can continue to improve our services. Please take a few minutes to complete the written survey that you may receive in the mail after your visit with us. Thank you!             Your Updated Medication List - Protect others around you: Learn how to safely use, store and throw away your medicines at www.disposemymeds.org.      Notice  As of 9/20/2017  1:29 PM    You have not been prescribed any medications.

## 2017-10-17 ENCOUNTER — ALLIED HEALTH/NURSE VISIT (OUTPATIENT)
Dept: FAMILY MEDICINE | Facility: CLINIC | Age: 42
End: 2017-10-17
Payer: COMMERCIAL

## 2017-10-17 DIAGNOSIS — Z23 NEED FOR PROPHYLACTIC VACCINATION AND INOCULATION AGAINST INFLUENZA: Primary | ICD-10-CM

## 2017-10-17 PROCEDURE — 90471 IMMUNIZATION ADMIN: CPT

## 2017-10-17 PROCEDURE — 90686 IIV4 VACC NO PRSV 0.5 ML IM: CPT

## 2017-10-17 PROCEDURE — 99207 ZZC NO CHARGE NURSE ONLY: CPT

## 2017-10-17 NOTE — MR AVS SNAPSHOT
After Visit Summary   10/17/2017    Vianey Ryan    MRN: [id number]           Patient Information     Date Of Birth          1975        Visit Information        Provider Department      10/17/2017 3:30 PM Doug/Thomas Cabezas [facility]        Today's Diagnoses     Need for prophylactic vaccination and inoculation against influenza    -  1       Follow-ups after your visit        Who to contact     Normal or non-critical lab and imaging results will be communicated to you by Upworthyhart, letter or phone within 4 business days after the clinic has received the results. If you do not hear from us within 7 days, please contact the clinic through Upworthyhart or phone. If you have a critical or abnormal lab result, we will notify you by phone as soon as possible.  Submit refill requests through CapLinked or call your pharmacy and they will forward the refill request to us. Please allow 3 business days for your refill to be completed.          If you need to speak with a  for additional information , please call: [phone]           Additional Information About Your Visit        UpworthyharSigndat Information     CapLinked gives you secure access to your electronic health record. If you see a primary care provider, you can also send messages to your care team and make appointments. If you have questions, please call your primary care clinic.  If you do not have a primary care provider, please call [phone] and they will assist you.        Care EveryWhere ID     This is your Care EveryWhere ID. This could be used by other organizations to access your [place] medical records  [id number]        Your Vitals Were     Last Period                   08/12/2016            Blood Pressure from Last 3 Encounters:   09/20/17 117/67   08/30/17 126/69   08/02/17 137/72    Weight from Last 3 Encounters:   09/20/17 128 lb 9.6 oz (58.3 kg)   08/30/17 130 lb 9.6 oz (59.2 kg)   04/11/17 124 lb (56.2 kg)               We Performed the Following     FLU VAC, SPLIT VIRUS IM > 3 YO (QUADRIVALENT) [72506]     Vaccine Administration, Initial [36279]        Primary Care Provider Office Phone # Fax #    Jose Hearn [phone] [address] [facility] [address]        Equal Access to Services     [facility] : Hadii aad ku hadasho Soomaali, waaxda luqadaha, qaybta kaalmada adeegyada, waxay ellenin hayaan adecassy vidalkatiebonita lamarcella . So [facility] [phone].    ATENCIÓN: Si habla español, tiene a rob disposición servicios gratuitos de asistencia lingüística. Rustyame al [phone].    We comply with applicable federal civil rights laws and [place] laws. We do not discriminate on the basis of race, color, national origin, age, disability, sex, sexual orientation, or gender identity.            Thank you!     Thank you for choosing [facility]  for your care. Our goal is always to provide you with excellent care. Hearing back from our patients is one way we can continue to improve our services. Please take a few minutes to complete the written survey that you may receive in the mail after your visit with us. Thank you!             Your Updated Medication List - Protect others around you: Learn how to safely use, store and throw away your medicines at www.disposemymeds.org.      Notice  As of 10/17/2017  4:01 PM    You have not been prescribed any medications.

## 2017-10-17 NOTE — PROGRESS NOTES
Injectable Influenza Immunization Documentation    1.  Is the person to be vaccinated sick today?   No    2. Does the person to be vaccinated have an allergy to a component   of the vaccine?   No    3. Has the person to be vaccinated ever had a serious reaction   to influenza vaccine in the past?   No    4. Has the person to be vaccinated ever had Guillain-Barré syndrome?   No    Form completed by Octavia Overton [facility]

## 2017-11-06 ENCOUNTER — MYC MEDICAL ADVICE (OUTPATIENT)
Dept: ENDOCRINOLOGY | Facility: CLINIC | Age: 42
End: 2017-11-06

## 2017-11-06 DIAGNOSIS — E04.2 MULTIPLE THYROID NODULES: ICD-10-CM

## 2017-11-06 DIAGNOSIS — E05.90 SUBCLINICAL HYPERTHYROIDISM: Primary | ICD-10-CM

## 2017-11-20 ENCOUNTER — TELEPHONE (OUTPATIENT)
Dept: ENDOCRINOLOGY | Facility: CLINIC | Age: 42
End: 2017-11-20

## 2017-12-14 ENCOUNTER — HOSPITAL ENCOUNTER (OUTPATIENT)
Dept: NUCLEAR MEDICINE | Facility: CLINIC | Age: 42
Setting detail: NUCLEAR MEDICINE
Discharge: HOME | End: 2017-12-15
Attending: INTERNAL MEDICINE | Admitting: INTERNAL MEDICINE
Payer: COMMERCIAL

## 2017-12-14 DIAGNOSIS — E05.90 SUBCLINICAL HYPERTHYROIDISM: ICD-10-CM

## 2017-12-14 DIAGNOSIS — E04.2 MULTIPLE THYROID NODULES: ICD-10-CM

## 2017-12-14 PROCEDURE — A9516 IODINE I-123 SOD IODIDE MIC: HCPCS | Performed by: INTERNAL MEDICINE

## 2017-12-14 PROCEDURE — 34300033 ZZH RX 343: Performed by: INTERNAL MEDICINE

## 2017-12-14 RX ADMIN — Medication 478 UCI.: at 08:34

## 2017-12-15 ENCOUNTER — HOSPITAL ENCOUNTER (OUTPATIENT)
Dept: NUCLEAR MEDICINE | Facility: CLINIC | Age: 42
Setting detail: NUCLEAR MEDICINE
End: 2017-12-15
Attending: INTERNAL MEDICINE
Payer: COMMERCIAL

## 2017-12-15 ENCOUNTER — OFFICE VISIT (OUTPATIENT)
Dept: FAMILY MEDICINE | Facility: CLINIC | Age: 42
End: 2017-12-15
Payer: COMMERCIAL

## 2017-12-15 VITALS
DIASTOLIC BLOOD PRESSURE: 89 MMHG | TEMPERATURE: 98.7 F | HEART RATE: 84 BPM | SYSTOLIC BLOOD PRESSURE: 139 MMHG | WEIGHT: 133 LBS | BODY MASS INDEX: 23.94 KG/M2

## 2017-12-15 DIAGNOSIS — R30.0 DYSURIA: Primary | ICD-10-CM

## 2017-12-15 DIAGNOSIS — R82.90 NONSPECIFIC FINDING ON EXAMINATION OF URINE: ICD-10-CM

## 2017-12-15 LAB
ALBUMIN UR-MCNC: NEGATIVE MG/DL
APPEARANCE UR: ABNORMAL
BACTERIA #/AREA URNS HPF: ABNORMAL /HPF
BILIRUB UR QL STRIP: NEGATIVE
COLOR UR AUTO: YELLOW
GLUCOSE UR STRIP-MCNC: NEGATIVE MG/DL
HGB UR QL STRIP: ABNORMAL
KETONES UR STRIP-MCNC: NEGATIVE MG/DL
LEUKOCYTE ESTERASE UR QL STRIP: ABNORMAL
NITRATE UR QL: NEGATIVE
NON-SQ EPI CELLS #/AREA URNS LPF: ABNORMAL /LPF
PH UR STRIP: 5.5 PH (ref 5–7)
RBC #/AREA URNS AUTO: ABNORMAL /HPF
SOURCE: ABNORMAL
SP GR UR STRIP: 1.01 (ref 1–1.03)
UROBILINOGEN UR STRIP-ACNC: 0.2 EU/DL (ref 0.2–1)
WBC #/AREA URNS AUTO: ABNORMAL /HPF

## 2017-12-15 PROCEDURE — 81001 URINALYSIS AUTO W/SCOPE: CPT | Performed by: FAMILY MEDICINE

## 2017-12-15 PROCEDURE — 78014 THYROID IMAGING W/BLOOD FLOW: CPT

## 2017-12-15 PROCEDURE — 99213 OFFICE O/P EST LOW 20 MIN: CPT | Performed by: FAMILY MEDICINE

## 2017-12-15 PROCEDURE — 87086 URINE CULTURE/COLONY COUNT: CPT | Performed by: FAMILY MEDICINE

## 2017-12-15 RX ORDER — SULFAMETHOXAZOLE/TRIMETHOPRIM 800-160 MG
1 TABLET ORAL 2 TIMES DAILY
Qty: 10 TABLET | Refills: 0 | Status: SHIPPED | OUTPATIENT
Start: 2017-12-15 | End: 2017-12-20

## 2017-12-15 NOTE — MR AVS SNAPSHOT
After Visit Summary   12/15/2017    Vianey Ryan    MRN: [id number]           Patient Information     Date Of Birth          1975        Visit Information        Provider Department      12/15/2017 10:00 AM Rosa Chen MD [facility]        Today's Diagnoses     Dysuria    -  1    Nonspecific finding on examination of urine           Follow-ups after your visit        Who to contact     If you have questions or need follow up information about today's clinic visit or your schedule please contact [facility] directly at [phone].  Normal or non-critical lab and imaging results will be communicated to you by PEAR SPORTShart, letter or phone within 4 business days after the clinic has received the results. If you do not hear from us within 7 days, please contact the clinic through [employer] or phone. If you have a critical or abnormal lab result, we will notify you by phone as soon as possible.  Submit refill requests through [employer] or call your pharmacy and they will forward the refill request to us. Please allow 3 business days for your refill to be completed.          Additional Information About Your Visit        MyChart Information     [employer] gives you secure access to your electronic health record. If you see a primary care provider, you can also send messages to your care team and make appointments. If you have questions, please call your primary care clinic.  If you do not have a primary care provider, please call [phone] and they will assist you.        Care EveryWhere ID     This is your Care EveryWhere ID. This could be used by other organizations to access your [place] medical records  [id number]        Your Vitals Were     Pulse Temperature Last Period BMI (Body Mass Index)          84 98.7  F (37.1  C) (Tympanic) 08/12/2016 23.94 kg/m2         Blood Pressure from Last 3 Encounters:   12/15/17 139/89   09/20/17 117/67   08/30/17 126/69    Weight  from Last 3 Encounters:   12/15/17 133 lb (60.3 kg)   09/20/17 128 lb 9.6 oz (58.3 kg)   08/30/17 130 lb 9.6 oz (59.2 kg)              We Performed the Following     UA reflex to Microscopic and Culture     Urine Culture Aerobic Bacterial     Urine Microscopic          Today's Medication Changes          These changes are accurate as of: 12/15/17 10:29 AM.  If you have any questions, ask your nurse or doctor.               Start taking these medicines.        Dose/Directions    sulfamethoxazole-trimethoprim 800-160 MG per tablet   Commonly known as:  BACTRIM DS   Used for:  Dysuria   Started by:  Rosa Chen MD        Dose:  1 tablet   Take 1 tablet by mouth 2 times daily for 5 days   Quantity:  10 tablet   Refills:  0            Where to get your medicines      These medications were sent to [facility] [address]  [address] [address]     Phone:  [phone]     sulfamethoxazole-trimethoprim 800-160 MG per tablet                Primary Care Provider Office Phone # Fax #    Jose Hearn [phone] [address] [facility] [address]        Equal Access to Services     CAMERON IRWIN : Hadii zara ku hadasho Soomaali, waaxda luqadaha, qaybta kaalmada adeegyada, ale roger. So [facility] [phone].    ATENCIÓN: Si habla español, tiene a rob disposición servicios gratuitos de asistencia lingüística. Llame al [phone].    We comply with applicable federal civil rights laws and [place] laws. We do not discriminate on the basis of race, color, national origin, age, disability, sex, sexual orientation, or gender identity.            Thank you!     Thank you for choosing [facility]  for your care. Our goal is always to provide you with excellent care. Hearing back from our patients is one way we can continue to improve our services. Please take a few minutes to complete the written  survey that you may receive in the mail after your visit with us. Thank you!             Your Updated Medication List - Protect others around you: Learn how to safely use, store and throw away your medicines at www.disposemymeds.org.          This list is accurate as of: 12/15/17 10:29 AM.  Always use your most recent med list.                   Brand Name Dispense Instructions for use Diagnosis    sulfamethoxazole-trimethoprim 800-160 MG per tablet    BACTRIM DS    10 tablet    Take 1 tablet by mouth 2 times daily for 5 days    Dysuria

## 2017-12-15 NOTE — NURSING NOTE
"Initial /89  Pulse 84  Temp 98.7  F (37.1  C) (Tympanic)  Wt 133 lb (60.3 kg)  LMP 08/12/2016  BMI 23.94 kg/m2 Estimated body mass index is 23.94 kg/(m^2) as calculated from the following:    Height as of 9/20/17: 5' 2.5\" (1.588 m).    Weight as of this encounter: 133 lb (60.3 kg). .      "

## 2017-12-15 NOTE — PROGRESS NOTES
SUBJECTIVE:                                                    Vianey Ryan is 42 year old female   Chief Complaint   Patient presents with     UTI     One day. Burning, frequency, hesitancy, incomplete voiding. Increasing fluids for treatment.          Problem list and histories reviewed & adjusted, as indicated.  Additional history: as documented    Patient Active Problem List   Diagnosis     Basal cell carcinoma of forehead     IBS (irritable bowel syndrome)     CARDIOVASCULAR SCREENING; LDL GOAL LESS THAN 160     Abnormal thyroid function test     health care home     Goiter     Hyperthyroidism     Family history of malignant neoplasm of breast     Past Surgical History:   Procedure Laterality Date     C/SECTION, LOW TRANSVERSE  2006    Severe pre-eclampsia      SECTION, TUBAL LIGATION, COMBINED N/A 4/3/2015    Procedure: COMBINED  SECTION, TUBAL LIGATION;  Surgeon: Tay Mosqueda MD;  Location: [facility]     COLONOSCOPY  2013    Procedure: COLONOSCOPY;  Colonoscopy;  Surgeon: Manan Rangel MD;  Location: [facility]     CYSTOSCOPY N/A 2016    Procedure: CYSTOSCOPY;  Surgeon: aTy Mosqueda MD;  Location: [facility]     D & C  8/6/10     HC MOHS HEAD/NCK/HND/FT/GEN 1ST STAGE UP T0 5 BLOCKS  09    Mohs excision right central forehead BCC     HYSTERECTOMY, PAP NO LONGER INDICATED  2016     HYSTEROSCOPY  8/6/10     LAPAROSCOPIC CHOLECYSTECTOMY N/A 2015    Procedure: LAPAROSCOPIC CHOLECYSTECTOMY;  Surgeon: Landon Schultz MD;  Location: [facility]     LAPAROSCOPIC HYSTERECTOMY TOTAL N/A 2016    Procedure: LAPAROSCOPIC HYSTERECTOMY TOTAL;  Surgeon: Tay Mosqueda MD;  Location: [facility]     MOHS MICROGRAPHIC PROCEDURE       TONSILLECTOMY         Social History   Substance Use Topics     Smoking status: Never Smoker     Smokeless tobacco: Never Used     Alcohol use No     Family History   Problem Relation Age of Onset     Hypertension Father      Hypertension  "Paternal Grandmother      Lipids Paternal Grandmother      Thyroid Disease Paternal Grandmother      Blood Disease Mother      Anemia     DIABETES Mother      \"borderline diabetic\"     Breast Cancer Mother 63     triple negative stage 2A     Alcohol/Drug Paternal Grandfather      alcohol     CANCER Maternal Grandfather      skin     Alzheimer Disease Maternal Grandmother          Current Outpatient Prescriptions   Medication Sig Dispense Refill     sulfamethoxazole-trimethoprim (BACTRIM DS) 800-160 MG per tablet Take 1 tablet by mouth 2 times daily for 5 days 10 tablet 0     Allergies   Allergen Reactions     Nkda [No Known Drug Allergies]      Recent Labs   Lab Test  08/30/17   1458  03/23/17   1012  05/20/16   0851   09/16/15   1105   03/28/15   1735   02/04/15   1516   08/21/13   0956   11/14/12   0804   LDL   --    --    --    --    --    --    --    --    --    --   59   --   57   HDL   --    --    --    --    --    --    --    --    --    --   56   --   59   TRIG   --    --    --    --    --    --    --    --    --    --   44   --   54   ALT   --    --    --    --   20   --   10   --   17   < >   --    < >   --    CR   --   0.65   --    --   0.75   --   0.67   < >   --    < >   --    < >   --    GFRESTIMATED   --   >90  Non  GFR Calc     --    --   86   --   >90  Non  GFR Calc     < >   --    < >   --    < >   --    GFRESTBLACK   --   >90   GFR Calc     --    --   >90   GFR Calc     --   >90   GFR Calc     < >   --    < >   --    < >   --    POTASSIUM   --   3.4   --    --   3.7   --    --    --    --    < >   --    < >   --    TSH  0.23*   --   0.29*   < >   --    < >   --    --    --    < >  0.34*   --    --     < > = values in this interval not displayed.      BP Readings from Last 3 Encounters:   12/15/17 139/89   09/20/17 117/67   08/30/17 126/69    Wt Readings from Last 3 Encounters:   12/15/17 133 lb (60.3 kg)   09/20/17 128 " lb 9.6 oz (58.3 kg)   08/30/17 130 lb 9.6 oz (59.2 kg)         ROS:  Constitutional, HEENT, cardiovascular, pulmonary, gi and gu systems are negative, except as otherwise noted.    OBJECTIVE:                                                    /89  Pulse 84  Temp 98.7  F (37.1  C) (Tympanic)  Wt 133 lb (60.3 kg)  LMP 08/12/2016  BMI 23.94 kg/m2  GENERAL APPEARANCE ADULT: Alert, no acute distress  PSYCH: mentation appears normal., affect and mood normal  Diagnostic Test Results:  Results for orders placed or performed in visit on 12/15/17   UA reflex to Microscopic and Culture   Result Value Ref Range    Color Urine Yellow     Appearance Urine Cloudy     Glucose Urine Negative NEG^Negative mg/dL    Bilirubin Urine Negative NEG^Negative    Ketones Urine Negative NEG^Negative mg/dL    Specific Gravity Urine 1.015 1.003 - 1.035    Blood Urine Moderate (A) NEG^Negative    pH Urine 5.5 5.0 - 7.0 pH    Protein Albumin Urine Negative NEG^Negative mg/dL    Urobilinogen Urine 0.2 0.2 - 1.0 EU/dL    Nitrite Urine Negative NEG^Negative    Leukocyte Esterase Urine Large (A) NEG^Negative    Source Midstream Urine    Urine Microscopic   Result Value Ref Range    WBC Urine  (A) OTO2^O - 2 /HPF    RBC Urine 10-25 (A) OTO2^O - 2 /HPF    Squamous Epithelial /LPF Urine Few FEW^Few /LPF    Bacteria Urine Few (A) NEG^Negative /HPF        ASSESSMENT/PLAN:                                                    1. Dysuria  UTI will treat for 5 days, urine culture back in 3 with sensitivities, if resistant will change bactrim to another  - UA reflex to Microscopic and Culture  - Urine Microscopic  - sulfamethoxazole-trimethoprim (BACTRIM DS) 800-160 MG per tablet; Take 1 tablet by mouth 2 times daily for 5 days  Dispense: 10 tablet; Refill: 0    2. Nonspecific finding on examination of urine  - Urine Culture Aerobic Bacterial    Rosa Chen MD  [facility]

## 2017-12-16 LAB
BACTERIA SPEC CULT: NORMAL
Lab: NORMAL
SPECIMEN SOURCE: NORMAL

## 2018-10-15 ENCOUNTER — ALLIED HEALTH/NURSE VISIT (OUTPATIENT)
Dept: FAMILY MEDICINE | Facility: CLINIC | Age: 43
End: 2018-10-15
Payer: COMMERCIAL

## 2018-10-15 DIAGNOSIS — Z23 NEED FOR PROPHYLACTIC VACCINATION AND INOCULATION AGAINST INFLUENZA: Primary | ICD-10-CM

## 2018-10-15 PROCEDURE — 90686 IIV4 VACC NO PRSV 0.5 ML IM: CPT

## 2018-10-15 PROCEDURE — 99207 ZZC NO CHARGE NURSE ONLY: CPT

## 2018-10-15 PROCEDURE — 90471 IMMUNIZATION ADMIN: CPT

## 2018-10-15 NOTE — PROGRESS NOTES

## 2018-10-15 NOTE — MR AVS SNAPSHOT
After Visit Summary   10/15/2018    Vianey Ryan    MRN: [id number]           Patient Information     Date Of Birth          1975        Visit Information        Provider Department      10/15/2018 10:30 AM [facility]        Today's Diagnoses     Need for prophylactic vaccination and inoculation against influenza    -  1       Follow-ups after your visit        Your next 10 appointments already scheduled     Oct 15, 2018 10:30 AM CDT   Nurse Only with [facility] FLU SHOT [facility]    [address] [address] [phone]              Who to contact     If you have questions or need follow up information about today's clinic visit or your schedule please contact [facility] directly at [phone].  Normal or non-critical lab and imaging results will be communicated to you by Neotracthart, letter or phone within 4 business days after the clinic has received the results. If you do not hear from us within 7 days, please contact the clinic through Neotracthart or phone. If you have a critical or abnormal lab result, we will notify you by phone as soon as possible.  Submit refill requests through [employer] or call your pharmacy and they will forward the refill request to us. Please allow 3 business days for your refill to be completed.          Additional Information About Your Visit        MyChart Information     [employer] gives you secure access to your electronic health record. If you see a primary care provider, you can also send messages to your care team and make appointments. If you have questions, please call your primary care clinic.  If you do not have a primary care provider, please call [phone] and they will assist you.        Care EveryWhere ID     This is your Care EveryWhere ID. This could be used by other organizations to access your [place] medical records  [id number]         Your Vitals Were     Last Period                   08/12/2016            Blood Pressure from Last 3 Encounters:   12/15/17 139/89   09/20/17 117/67   08/30/17 126/69    Weight from Last 3 Encounters:   12/15/17 133 lb (60.3 kg)   09/20/17 128 lb 9.6 oz (58.3 kg)   08/30/17 130 lb 9.6 oz (59.2 kg)              We Performed the Following     FLU VACCINE, SPLIT VIRUS, IM (QUADRIVALENT) [14651]- >3 YRS     Vaccine Administration, Initial [74975]        Primary Care Provider Office Phone # Fax #    Jose Hearn [phone] [address] [address]        Equal Access to Services     CAMERON IRWIN : To durhamo Socici, waaxda luqadaha, qaybta kaalmada adecassyyaleonel, ale roger. So [facility] [phone].    ATENCIÓN: Si habla español, tiene a rob disposición servicios gratuitos de asistencia lingüística. Llame al [phone].    We comply with applicable federal civil rights laws and [place] laws. We do not discriminate on the basis of race, color, national origin, age, disability, sex, sexual orientation, or gender identity.            Thank you!     Thank you for choosing [facility]  for your care. Our goal is always to provide you with excellent care. Hearing back from our patients is one way we can continue to improve our services. Please take a few minutes to complete the written survey that you may receive in the mail after your visit with us. Thank you!             Your Updated Medication List - Protect others around you: Learn how to safely use, store and throw away your medicines at www.disposemymeds.org.      Notice  As of 10/15/2018 10:08 AM    You have not been prescribed any medications.

## 2019-01-18 ENCOUNTER — OFFICE VISIT (OUTPATIENT)
Dept: FAMILY MEDICINE | Facility: CLINIC | Age: 44
End: 2019-01-18
Payer: COMMERCIAL

## 2019-01-18 VITALS
OXYGEN SATURATION: 98 % | WEIGHT: 136 LBS | BODY MASS INDEX: 24.1 KG/M2 | HEIGHT: 63 IN | DIASTOLIC BLOOD PRESSURE: 68 MMHG | TEMPERATURE: 98.3 F | SYSTOLIC BLOOD PRESSURE: 110 MMHG | HEART RATE: 80 BPM | RESPIRATION RATE: 16 BRPM

## 2019-01-18 DIAGNOSIS — J10.1 INFLUENZA A: Primary | ICD-10-CM

## 2019-01-18 LAB
FLUAV+FLUBV AG SPEC QL: NEGATIVE
FLUAV+FLUBV AG SPEC QL: POSITIVE
SPECIMEN SOURCE: ABNORMAL

## 2019-01-18 PROCEDURE — 99213 OFFICE O/P EST LOW 20 MIN: CPT | Performed by: NURSE PRACTITIONER

## 2019-01-18 PROCEDURE — 87804 INFLUENZA ASSAY W/OPTIC: CPT | Performed by: NURSE PRACTITIONER

## 2019-01-18 RX ORDER — PREDNISONE 20 MG/1
20 TABLET ORAL DAILY
Qty: 5 TABLET | Refills: 0 | Status: SHIPPED | OUTPATIENT
Start: 2019-01-18 | End: 2019-01-23

## 2019-01-18 ASSESSMENT — MIFFLIN-ST. JEOR: SCORE: 1238.08

## 2019-01-18 NOTE — PROGRESS NOTES
"SUBJECTIVE:  Vianey Ryan is a 43 year old female who presents with the following concerns;              Symptoms: cc Present Absent Comment   Fever/Chills  x     Fatigue  x     Muscle Aches  x  2 days ago   Eye Irritation  x     Sneezing  x     Nasal Jeff/Drg  x     Sinus Pressure/Pain   x    Loss of smell   x    Dental pain   x    Sore Throat   x    Swollen Glands   x    Ear Pain/Fullness   x    Cough x x  Some productive   Wheeze   x    Chest Pain  x     Shortness of breath  x     Rash   x    Other         Symptom duration:  5 days   Sympom severity:  not getting any better   Treatments tried:  otc ibuprofen   Contacts:  family     5-day course of coughing, low energy level, myalgias, fatigue..  Fever has been persistent over the past 5 days with 100.2 max.  Coughing so hard that her chest starts to hurt, feels very tight.  Also reports some head cold symptoms, stuffy nose but no sinus pain or pressure, no ear discomfort.  Beginning last night, her cough has been productive but she does not know what the sputum looks like.  2 of her children have had viral illnesses.  Strep negative for 1 child and influenza negative on the other child.      Medications updated and reviewed.  Past, family and surgical history is updated and reviewed in the record.  ROS:  Other than noted above, general, HEENT, respiratory, cardiac and gastrointestinal systems are negative.    OBJECTIVE:  /68   Pulse 80   Temp 98.3  F (36.8  C) (Tympanic)   Resp 16   Ht 1.588 m (5' 2.5\")   Wt 61.7 kg (136 lb)   LMP 08/12/2016   SpO2 98%   BMI 24.48 kg/m       GENERAL: Pleasant and interactive. No acute distress.  HEENT: Oropharynx moist and clear, erythematous.   NECK: supple and free of adenopathy or masses, the thyroid is normal without enlargement or nodules.  CHEST: Harsh dry cough noted.  Lung sounds clear, no wheezing, rales or rhonchi.  HEART:  S1 and S2 normal, no murmurs, clicks, gallops or rubs. Regular rate and " rhythm.  SKIN:  No unusual rashes or suspicious skin lesions noted.    Diagnostic Test Results:  Results for orders placed or performed in visit on 01/18/19   Influenza A/B antigen   Result Value Ref Range    Influenza A/B Agn Specimen Nasal     Influenza A Positive (A) NEG^Negative    Influenza B Negative NEG^Negative       ASSESSMENT/PLAN:     ASSESSMENT:  1. Influenza A.  Outside the window for treatment.  Will treat with prednisone.       PLAN:  Orders Placed This Encounter     predniSONE (DELTASONE) 20 MG tablet       Patient Instructions   Take full course of prednisone- take in the morning.    Increase rest, fluids.  Patient Education     Influenza (Adult)    Influenza is also called the flu. It is a viral illness that affects the air passages of your lungs. It is different from the common cold. The flu can easily be passed from one to person to another. It may be spread through the air by coughing and sneezing. Or it can be spread by touching the sick person and then touching your own eyes, nose, or mouth.  The flu starts 1 to 3 days after you are exposed to the flu virus. It may last for 1 to 2 weeks but many people feel tired or fatigued for many weeks afterward. You usually don t need to take antibiotics unless you have a complication. This might be an ear or sinus infection or pneumonia.  Symptoms of the flu may be mild or severe. They can include extreme tiredness (wanting to stay in bed all day), chills, fevers, muscle aches, soreness with eye movement, headache, and a dry, hacking cough.  Home care  Follow these guidelines when caring for yourself at home:    Avoid being around cigarette smoke, whether yours or other people s.    Acetaminophen or ibuprofen will help ease your fever, muscle aches, and headache. Don t give aspirin to anyone younger than 18 who has the flu. Aspirin can harm the liver.    Nausea and loss of appetite are common with the flu. Eat light meals. Drink 6 to 8 glasses of liquids  every day. Good choices are water, sport drinks, soft drinks without caffeine, juices, tea, and soup. Extra fluids will also help loosen secretions in your nose and lungs.    Over-the-counter cold medicines will not make the flu go away faster. But the medicines may help with coughing, sore throat, and congestion in your nose and sinuses. Don t use a decongestant if you have high blood pressure.    Stay home until your fever has been gone for at least 24 hours without using medicine to reduce fever.  Follow-up care  Follow up with your healthcare provider, or as advised, if you are not getting better over the next week.  If you are age 65 or older, talk with your provider about getting a pneumococcal vaccine every 5 years. You should also get this vaccine if you have chronic asthma or COPD. All adults should get a flu vaccine every fall. Ask your provider about this.  When to seek medical advice  Call your healthcare provider right away if any of these occur:    Cough with lots of colored mucus (sputum) or blood in your mucus    Chest pain, shortness of breath, wheezing, or trouble breathing    Severe headache, or face, neck, or ear pain    New rash with fever    Fever of 100.4 F (38 C) or higher, or as directed by your healthcare provider    Confusion, behavior change, or seizure    Severe weakness or dizziness    You get a new fever or cough after getting better for a few days  Date Last Reviewed: 1/1/2017 2000-2018 [employer]. [address] [place] [address]. All rights reserved. This information is not intended as a substitute for professional medical care. Always follow your healthcare professional's instructions.           Patient agrees with plan of care as outlined. Call or return to the clinic with any worsening of symptoms or no resolution. Medication side effects reviewed.    Tahira Ojeda, ANTHONY, APRN [facility]

## 2019-01-18 NOTE — PATIENT INSTRUCTIONS
Take full course of prednisone- take in the morning.    Increase rest, fluids.  Patient Education     Influenza (Adult)    Influenza is also called the flu. It is a viral illness that affects the air passages of your lungs. It is different from the common cold. The flu can easily be passed from one to person to another. It may be spread through the air by coughing and sneezing. Or it can be spread by touching the sick person and then touching your own eyes, nose, or mouth.  The flu starts 1 to 3 days after you are exposed to the flu virus. It may last for 1 to 2 weeks but many people feel tired or fatigued for many weeks afterward. You usually don t need to take antibiotics unless you have a complication. This might be an ear or sinus infection or pneumonia.  Symptoms of the flu may be mild or severe. They can include extreme tiredness (wanting to stay in bed all day), chills, fevers, muscle aches, soreness with eye movement, headache, and a dry, hacking cough.  Home care  Follow these guidelines when caring for yourself at home:    Avoid being around cigarette smoke, whether yours or other people s.    Acetaminophen or ibuprofen will help ease your fever, muscle aches, and headache. Don t give aspirin to anyone younger than 18 who has the flu. Aspirin can harm the liver.    Nausea and loss of appetite are common with the flu. Eat light meals. Drink 6 to 8 glasses of liquids every day. Good choices are water, sport drinks, soft drinks without caffeine, juices, tea, and soup. Extra fluids will also help loosen secretions in your nose and lungs.    Over-the-counter cold medicines will not make the flu go away faster. But the medicines may help with coughing, sore throat, and congestion in your nose and sinuses. Don t use a decongestant if you have high blood pressure.    Stay home until your fever has been gone for at least 24 hours without using medicine to reduce fever.  Follow-up care  Follow up with your  healthcare provider, or as advised, if you are not getting better over the next week.  If you are age 65 or older, talk with your provider about getting a pneumococcal vaccine every 5 years. You should also get this vaccine if you have chronic asthma or COPD. All adults should get a flu vaccine every fall. Ask your provider about this.  When to seek medical advice  Call your healthcare provider right away if any of these occur:    Cough with lots of colored mucus (sputum) or blood in your mucus    Chest pain, shortness of breath, wheezing, or trouble breathing    Severe headache, or face, neck, or ear pain    New rash with fever    Fever of 100.4 F (38 C) or higher, or as directed by your healthcare provider    Confusion, behavior change, or seizure    Severe weakness or dizziness    You get a new fever or cough after getting better for a few days  Date Last Reviewed: 1/1/2017 2000-2018 [employer]. [address] [place] [address], [place] [address]. All rights reserved. This information is not intended as a substitute for professional medical care. Always follow your healthcare professional's instructions.

## 2019-03-20 ENCOUNTER — OFFICE VISIT (OUTPATIENT)
Dept: OBGYN | Facility: CLINIC | Age: 44
End: 2019-03-20
Payer: COMMERCIAL

## 2019-03-20 VITALS
DIASTOLIC BLOOD PRESSURE: 73 MMHG | HEART RATE: 79 BPM | BODY MASS INDEX: 23.57 KG/M2 | SYSTOLIC BLOOD PRESSURE: 100 MMHG | HEIGHT: 63 IN | TEMPERATURE: 97.6 F | WEIGHT: 133 LBS | RESPIRATION RATE: 16 BRPM

## 2019-03-20 DIAGNOSIS — Z01.419 ENCOUNTER FOR GYNECOLOGICAL EXAMINATION WITHOUT ABNORMAL FINDING: Primary | ICD-10-CM

## 2019-03-20 PROCEDURE — 99396 PREV VISIT EST AGE 40-64: CPT | Performed by: OBSTETRICS & GYNECOLOGY

## 2019-03-20 ASSESSMENT — MIFFLIN-ST. JEOR: SCORE: 1232.41

## 2019-03-20 NOTE — PROGRESS NOTES
SUBJECTIVE:   CC: Vianey Ryan is an 43 year old woman who presents for preventive health visit.     Healthy Habits:    Do you get at least three servings of calcium containing foods daily (dairy, green leafy vegetables, etc.)? yes    Amount of exercise or daily activities, outside of work: 5 day(s) per week    Problems taking medications regularly No    Medication side effects: No    Have you had an eye exam in the past two years? yes    Do you see a dentist twice per year? yes    Do you have sleep apnea, excessive snoring or daytime drowsiness?no          Today's PHQ-2 Score:   PHQ-2 (  [employer]) 3/20/2019 2019   Q1: Little interest or pleasure in doing things 0 0   Q2: Feeling down, depressed or hopeless 0 0   PHQ-2 Score 0 0       Abuse: Current or Past(Physical, Sexual or Emotional)- No  Do you feel safe in your environment? Yes    Social History     Tobacco Use     Smoking status: Never Smoker     Smokeless tobacco: Never Used   Substance Use Topics     Alcohol use: No     If you drink alcohol do you typically have >3 drinks per day or >7 drinks per week? No                     Reviewed orders with patient.  Reviewed health maintenance and updated orders accordingly - Yes  BP Readings from Last 3 Encounters:   19 100/73   19 110/68   12/15/17 139/89    Wt Readings from Last 3 Encounters:   19 60.3 kg (133 lb)   19 61.7 kg (136 lb)   12/15/17 60.3 kg (133 lb)                  Patient Active Problem List   Diagnosis     Basal cell carcinoma of forehead     IBS (irritable bowel syndrome)     CARDIOVASCULAR SCREENING; LDL GOAL LESS THAN 160     Abnormal thyroid function test     health care home     Goiter     Hyperthyroidism     Family history of malignant neoplasm of breast     Past Surgical History:   Procedure Laterality Date     C/SECTION, LOW TRANSVERSE  2006    Severe pre-eclampsia      SECTION, TUBAL LIGATION, COMBINED N/A 4/3/2015    Procedure: COMBINED  " SECTION, TUBAL LIGATION;  Surgeon: Tay Mosqueda MD;  Location: [facility]     COLONOSCOPY  2013    Procedure: COLONOSCOPY;  Colonoscopy;  Surgeon: Manan Rangel MD;  Location: [facility]     CYSTOSCOPY N/A 2016    Procedure: CYSTOSCOPY;  Surgeon: Tay Mosqueda MD;  Location: [facility]     D & C  8/6/10     HC MOHS HEAD/NCK/HND/FT/GEN 1ST STAGE UP T0 5 BLOCKS  09    Mohs excision right central forehead BCC     HYSTERECTOMY, PAP NO LONGER INDICATED  2016     HYSTEROSCOPY  8/6/10     LAPAROSCOPIC CHOLECYSTECTOMY N/A 2015    Procedure: LAPAROSCOPIC CHOLECYSTECTOMY;  Surgeon: Landon Schultz MD;  Location: [facility]     LAPAROSCOPIC HYSTERECTOMY TOTAL N/A 2016    Procedure: LAPAROSCOPIC HYSTERECTOMY TOTAL;  Surgeon: Tay Mosqueda MD;  Location: [facility]     MOHS MICROGRAPHIC PROCEDURE       TONSILLECTOMY         Social History     Tobacco Use     Smoking status: Never Smoker     Smokeless tobacco: Never Used   Substance Use Topics     Alcohol use: No     Family History   Problem Relation Age of Onset     Hypertension Father      Hypertension Paternal Grandmother      Lipids Paternal Grandmother      Thyroid Disease Paternal Grandmother      Blood Disease Mother         Anemia     Diabetes Mother         \"borderline diabetic\"     Breast Cancer Mother 63        triple negative stage 2A     Alcohol/Drug Paternal Grandfather         alcohol     Cancer Maternal Grandfather         skin     Alzheimer Disease Maternal Grandmother          No current outpatient medications on file.     Allergies   Allergen Reactions     Nkda [No Known Drug Allergies]        Mammogram Screening: Patient under age 50, mutual decision reflected in health maintenance.      Pertinent mammograms are reviewed under the imaging tab.  History of abnormal Pap smear: Status post benign hysterectomy. Health Maintenance and Surgical History updated.  PAP / HPV 1/15/2014 2011 3/6/2009   PAP NIL NIL NIL "     Reviewed and updated as needed this visit by clinical staff  Tobacco  Allergies  Meds  Med Hx  Surg Hx  Fam Hx  Soc Hx        Reviewed and updated as needed this visit by Provider        Past Medical History:   Diagnosis Date     Allergy to cats      Anemia 4/3/2015     Basal cell carcinoma      Chickenpox      Depressive disorder, not elsewhere classified     Depression (non-psychotic)     Metrorrhagia 2010     Ovarian cyst 2008     PONV (postoperative nausea and vomiting)      Severe preeclampsia      Thyroid disease      Urinary tract infections       Past Surgical History:   Procedure Laterality Date     C/SECTION, LOW TRANSVERSE  2006    Severe pre-eclampsia      SECTION, TUBAL LIGATION, COMBINED N/A 4/3/2015    Procedure: COMBINED  SECTION, TUBAL LIGATION;  Surgeon: Tay Mosqueda MD;  Location: [facility]     COLONOSCOPY  2013    Procedure: COLONOSCOPY;  Colonoscopy;  Surgeon: Manan Rangel MD;  [facility]: [facility]     CYSTOSCOPY N/A 2016    Procedure: CYSTOSCOPY;  Surgeon: Tay Mosqueda MD;  Location: [facility]     D & C  8/6/10     HC MOHS HEAD/NCK/HND/FT/GEN 1ST STAGE UP T0 5 BLOCKS  09    Mohs excision right central forehead BCC     HYSTERECTOMY, PAP NO LONGER INDICATED  2016     HYSTEROSCOPY  8/6/10     LAPAROSCOPIC CHOLECYSTECTOMY N/A 2015    Procedure: LAPAROSCOPIC CHOLECYSTECTOMY;  Surgeon: Landon Schultz MD;  Location: [facility]     LAPAROSCOPIC HYSTERECTOMY TOTAL N/A 2016    Procedure: LAPAROSCOPIC HYSTERECTOMY TOTAL;  Surgeon: Tay Mosqueda MD;  Location: [facility]     MOHS MICROGRAPHIC PROCEDURE       TONSILLECTOMY       Obstetric History       T2      L3     SAB1   TAB0   Ectopic0   Multiple0   Live Births3       # Outcome Date GA Lbr Fredy/2nd Weight Sex Delivery Anes PTL Lv   4 Term 04/03/15 37w5d  3.43 kg (7 lb 9 oz) M CS-LTranv Spinal N NEERAJ      Name: Fausto      Apgar1:  8                Apgar5: 8  "  3 Term 11 39w0d  3.487 kg (7 lb 11 oz) F CS-Unspec   NEERAJ      Name: Teresa      Apgar1:  9                Apgar5: 9   2  06 35w4d 03:00 3.175 kg (7 lb) F CS   NEERAJ      Name: Cindy       Apgar1:  8                Apgar5: 9   1 SAB 04 6w0d    SAB   DEC          ROS:  CONSTITUTIONAL: NEGATIVE for fever, chills, change in weight  INTEGUMENTARU/SKIN: NEGATIVE for worrisome rashes, moles or lesions  EYES: NEGATIVE for vision changes or irritation  ENT: NEGATIVE for ear, mouth and throat problems  RESP: NEGATIVE for significant cough or SOB  BREAST: NEGATIVE for masses, tenderness or discharge  CV: NEGATIVE for chest pain, palpitations or peripheral edema  GI: NEGATIVE for nausea, abdominal pain, heartburn, or change in bowel habits  : NEGATIVE for unusual urinary or vaginal symptoms. Periods are regular.  MUSCULOSKELETAL: NEGATIVE for significant arthralgias or myalgia  NEURO: NEGATIVE for weakness, dizziness or paresthesias  ENDOCRINE: NEGATIVE for temperature intolerance, skin/hair changes  HEME/ALLERGY/IMMUNE: NEGATIVE for bleeding problems  PSYCHIATRIC: NEGATIVE for changes in mood or affect    OBJECTIVE:   /73   Pulse 79   Temp 97.6  F (36.4  C)   Resp 16   Ht 1.6 m (5' 3\")   Wt 60.3 kg (133 lb)   LMP 2016   BMI 23.56 kg/m    EXAM:  GENERAL: healthy, alert and no distress  EYES: Eyes grossly normal to inspection, PERRL and conjunctivae and sclerae normal  HENT: ear canals and TM's normal, nose and mouth without ulcers or lesions  NECK: no adenopathy, no asymmetry, masses, or scars and thyroid normal to palpation  RESP: lungs clear to auscultation - no rales, rhonchi or wheezes  BREAST: normal without masses, tenderness or nipple discharge and no palpable axillary masses or adenopathy  CV: regular rate and rhythm, normal S1 S2, no S3 or S4, no murmur, click or rub, no peripheral edema and peripheral pulses strong  ABDOMEN: soft, nontender, no hepatosplenomegaly, no masses " "and bowel sounds normal   (female): normal female external genitalia, normal urethral meatus , vaginal mucosa pink, moist, well rugated and normal post-hysterectomy exam without masses.   MS: no gross musculoskeletal defects noted, no edema  SKIN: no suspicious lesions or rashes  NEURO: Normal strength and tone, mentation intact and speech normal  PSYCH: mentation appears normal, affect normal/bright    Diagnostic Test Results:  none     ASSESSMENT/PLAN:   (Z01.419) Encounter for gynecological examination without abnormal finding  (primary encounter diagnosis)  Comment: Normal examination  Plan: Return to clinic in 1 year         COUNSELING:   Reviewed preventive health counseling, as reflected in patient instructions       Regular exercise       Healthy diet/nutrition    BP Readings from Last 1 Encounters:   03/20/19 100/73     Estimated body mass index is 23.56 kg/m  as calculated from the following:    Height as of this encounter: 1.6 m (5' 3\").    Weight as of this encounter: 60.3 kg (133 lb).           reports that  has never smoked. she has never used smokeless tobacco.      Counseling Resources:  ATP IV Guidelines  Pooled Cohorts Equation Calculator  Breast Cancer Risk Calculator  FRAX Risk Assessment  ICSI Preventive Guidelines  Dietary Guidelines for Americans, 2010  USDA's MyPlate  ASA Prophylaxis  Lung CA Screening    Tay Mosqueda MD  [facility]  "

## 2019-04-03 ENCOUNTER — TELEPHONE (OUTPATIENT)
Dept: OBGYN | Facility: CLINIC | Age: 44
End: 2019-04-03

## 2019-04-03 NOTE — TELEPHONE ENCOUNTER
Return call to patient.  Spoke with patient on the phone.    Patient notified that Blood type is O+.  Patient may call [facility] for appointment for screening mammogram. Patient has the phone number.    Cecelia Foster   [facility]  RN

## 2019-04-03 NOTE — TELEPHONE ENCOUNTER
Pt calling asking what her blood type is and to get a mammogram order placed.    Please call-     Libra Kenney  [facility]

## 2019-10-07 ENCOUNTER — ALLIED HEALTH/NURSE VISIT (OUTPATIENT)
Dept: FAMILY MEDICINE | Facility: CLINIC | Age: 44
End: 2019-10-07
Payer: COMMERCIAL

## 2019-10-07 DIAGNOSIS — Z23 NEED FOR PROPHYLACTIC VACCINATION AND INOCULATION AGAINST INFLUENZA: Primary | ICD-10-CM

## 2019-10-07 PROCEDURE — 90686 IIV4 VACC NO PRSV 0.5 ML IM: CPT

## 2019-10-07 PROCEDURE — 90471 IMMUNIZATION ADMIN: CPT

## 2019-10-07 PROCEDURE — 99207 ZZC NO CHARGE NURSE ONLY: CPT

## 2019-10-09 ENCOUNTER — HOSPITAL ENCOUNTER (OUTPATIENT)
Dept: MAMMOGRAPHY | Facility: CLINIC | Age: 44
Discharge: HOME | End: 2019-10-09
Attending: OBSTETRICS & GYNECOLOGY | Admitting: OBSTETRICS & GYNECOLOGY
Payer: COMMERCIAL

## 2019-10-09 DIAGNOSIS — Z12.31 VISIT FOR SCREENING MAMMOGRAM: ICD-10-CM

## 2019-10-09 PROCEDURE — 77063 BREAST TOMOSYNTHESIS BI: CPT

## 2019-10-09 PROCEDURE — 77067 SCR MAMMO BI INCL CAD: CPT

## 2019-12-26 NOTE — PROGRESS NOTES
Diabetes[facility] Endocrinology [facility] Met[facility]       Reason for visit: follow up subclinical hyperthyroidism and multinodular goiter.    History of presenting illness:  Ms. Ryan is a 44 years old female with history of Raynaud's phenomenon, basal cell carcinoma, irritable bowel syndrome who is here for follow up subclinical hyperthyroidism and multinodular goiter. Previously seen in August 2017.    1) Subclinical hyperthyroidism: She was noted to have abnormal thyroid function tests (low TSH, normal free T4) and significant weight loss after she gave birth to her first child in Jan 2006. This pregnancy was complicated by severe preeclampsia that resolved after delivery. Her thyroid function tests normalized roughly 2.5 years after her first delivery. However, after delivery of her second child in August 2011, she once again had abnormal thyroid function tests (low TSH, normal free T4). Since then, her TSH has remained slightly depressed (0.22 to 0.39) with normal free T4 levels.     When I saw her in 2013, TFT was normalized. However, in 2014, TSH was slightly suppressed with normal FT4. She was pregnant with her third pregnancy and gave birth in April 2015. Since then, she continued to have mild suppressed TSH with normal FT4 and TT3. TSI and TPO Abs were all negative.    2) multinodular goiter: She also had thyroid ultrasound in 11/2013 showed multinodular goiter ranged from 5-10 mm. She did have FNA of left #5 and right#2 nodule in 2014. The left #5 was benign but the right #2 showed AUS with negative mutation for gene specific for thyroid cancer but it was positive for TSHR which reported primarily in benign nodule (please see detail in result section)    She has been doing well. She came today for check-up. She continues to have similar symptoms of insomnia, sweating, cold intolerance and loose stool. All her symptoms are off and on. She she denies any . She reports feeling of intermittent neck pressure but no  "neck pain, difficulty swallowing, or changes in her voice. She denied any fevers, chills, chest pain or discomfort, or shortness of breath, constipation. She sleeps well.     Past Medical History:  Subclinical hyperthyroidism  Multinodular goiter  G3, P3  Severe preeclampsia during 1st pregnancy  Depression  Raynaud's   Basal cell carcinoma, superior Left breast  Basal cell carcinoma, superior Right forehead  Irritable bowel syndrome  Hx of ovarian cyst  Metrorrhagia    Past Surgical History:  Tonsilectomy   section x 2  Mohs procedure  D & C  Colonoscopy     Medications:  No current outpatient medications on file.      Allergies:  Allergies   Allergen Reactions     Nkda [No Known Drug Allergies]      Family History:  Family history of Thyroid disease: Graves' disease (paternal grandmother)  History of Thyroid Cancer: none    Colon cancer - father, uncle, grandfather  HTN - maternal/paternal  Lupus - cousin on maternal side  Type I diabetes mellitus - maternal    Patient has 1 sister who is healthy    Social History:  , 2 children (7 and 2), , non-smoker, non-drinker, no illicit drug use    Review of System:  10 point ROS was done and was negative except mention above.     Physical Examination:  Vital signs:   /75   Pulse 65   Wt 63.7 kg (140 lb 6.4 oz)   LMP 2016   BMI 24.87 kg/m    Estimated body mass index is 24.87 kg/m  as calculated from the following:    Height as of 3/20/19: 1.6 m (5' 3\").    Weight as of this encounter: 63.7 kg (140 lb 6.4 oz).  Gen: NAD, comfortable  HEENT: Head normal, no lid lag, retraction, no proptosis  NECK: Supple, thyroid is minimally diffusely enlarged particularly on the right side. No palpable masses or nodules in the thyroid bed; no cervical or supraclavicular adenopathy noted.  CVS: Regular rate and rhythm, normal S1, S2. No murmurs, rubs, or gallops.  LUNGS: Clear to ascultation bilaterally  GI: Soft, non-tender, normal bowel sounds, no " organomegaly.  CNS: Fine tremor in hands, normal patellar reflexes  Extremities: no edema or rashes  PSYCH: Normal mood, pleasant affect    Lab:  4/29/2014  CYTOLOGIC INTERPRETATION:    A. Lymph node, right level 3, US-FNA:  - Polymorphous lymphoid elements  - No evidence of malignancy  Specimen Adequacy: Satisfactory for evaluation.    B. Thyroid, right#2, US-FNA:  - Atypia of Undetermined Significance  Specimen Adequacy: Satisfactory for evaluation.    The [place] Implied Risk of Malignancy and Recommended Clinical Management:  Atypia of Undetermined Significance has a 5-15% risk of malignancy, recommended repeat FNA.    The specimen will be submitted for molecular studies    C. Thyroid, left #5, US-FNA: Benign  - Consistent with a benign nodule (includes adenomatoid nodule, colloid nodule, etc.)  Specimen Adequacy: Satisfactory for evaluation.     Next generation sequencing panel for thyroid cancer (ThyroSeq).    RESULTS:  A. Mutations associated with high cancer risk are NOT identified.  B. 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      The tested sample is NEGATIVE for hotspots of the following gene(s):  AKT1, BRAF, CTNNB1, GNAS, HRAS, KRAS, NRAS, PIK3CA, PTEN, RET, TP53,  RET/PTC1, RET/PTC3, PAX8/PPARg    INTERPRETATION:  In this case, no mutations associated with well-established cancer risk were identified. In addition, the TSHR gene mutation p.D633H was identified with the allelic frequency of 6%, which corresponds to 12% of cells with heterozygous TSHR mutation in the collected sample. Somatic mutations of the thyrotropin receptor (TSHR) gene are known to frequently occur in autonomously functioning thyroid nodules (AFTN)(1-3). Mutations in this particular codon have been reported primarily in benign thyroid nodules and frequently in AFTN (4-7), although single cases of thyroid cancer carrying TSHR mutation in hyperfunctioning nodules have also been reported  "(3,8). In our experience, rare nodules with TSHR mutations found to be follicular cancers after excision had TSHR mutation present at high level, i.e., with allelic frequency of >30%. Therefore, based on the current knowledge, the results of molecular testing are consistent with a BENIGN nodule; however, these results should be interpreted with caution and in correlation with cytological, imaging, and other clinical and laboratory data.    COMMENT:    Complete report has been scanned into the patient's electronic medical record.    Imaging:  Thyroid uptake and scan 12/15/2017  FINDINGS: Heterogeneous uptake by the thyroid gland is seen. This can be seen with a goiter. No \"cold\" nodules are demonstrated. 24-hour uptake is calculated to be 14%.                                                                    IMPRESSION: 24-hour uptake is within normal limits.    Ultrasound 9/5/2017  FINDINGS: The right lobe of the thyroid gland measures 5.4 x 2.0 x 1.7 cm. The left lobe of the thyroid gland measures 5.9 x 2.0 x 2.0 cm.  The isthmus measures 0.4 cm.  Multiple focal findings are seen in the right lobe. A cystic and solid tiny nodule in the mid to lower pole measures 0.5 x 0.4 x 0.4 cm. A predominantly solid nodule in the lower pole measures 0.9 x 0.5 x 0.7 cm. A solid nodule in the lower pole measures 0.8 x 0.4 x 0.7 cm.  There are multiple focal findings in the left lobe. There is a solid hypoechoic nodule in the upper pole that measures 0.8 x 0.4 x 0.8 cm. There is a solid hypoechoic nodule in the mid to upper pole that measures 0.8 x 0.6 x 1.0 cm. There is a cyst in the lower pole that measures 0.6 x 0.4 x 0.5 cm. There is a cystic and solid nodule in the lower pole that measures 1.4 x 0.8 x 1.4 cm. There is a a cystic and solid nodule in the lower pole that measures 0.8 x 0.5 x 0.6 cm.                                                                 Assessment:  Ms. Ryan is a 44 year old female with a history of " subclinical hyperthyroidism and multinodular goiter presents today for follow up.     1) Subclinical hyperthyroidism: her TSH was fluctuated and mildly suppressed. TSI and TPO were negative. Differential Dx includes seronegative Graves' disease vs. multinodular goiter. She is clinically euthyroid. I will repeat lab today.    2) multiple thyroid nodules: she has had multiple nodules that was biopsied in 4/2014. The left #5 was benign but the right #2 was AUS with TSHR mutation which is not commonly associated with cancer. She has no compressive symptoms. I will follow up US thyroid this year.    Plan:   - follow up TSH, FT4, TT3 today  - follow up US thyroid    Ronald Aguilera MD  Endocrinology  [phone]

## 2019-12-27 ENCOUNTER — OFFICE VISIT (OUTPATIENT)
Dept: ENDOCRINOLOGY | Facility: CLINIC | Age: 44
End: 2019-12-27
Payer: COMMERCIAL

## 2019-12-27 VITALS
WEIGHT: 140.4 LBS | HEART RATE: 65 BPM | BODY MASS INDEX: 24.87 KG/M2 | SYSTOLIC BLOOD PRESSURE: 116 MMHG | DIASTOLIC BLOOD PRESSURE: 75 MMHG

## 2019-12-27 DIAGNOSIS — E05.90 SUBCLINICAL HYPERTHYROIDISM: Primary | ICD-10-CM

## 2019-12-27 DIAGNOSIS — E04.2 MULTINODULAR GOITER: ICD-10-CM

## 2019-12-27 DIAGNOSIS — E05.90 SUBCLINICAL HYPERTHYROIDISM: ICD-10-CM

## 2019-12-27 LAB
T3 SERPL-MCNC: 96 NG/DL (ref 60–181)
T4 FREE SERPL-MCNC: 0.79 NG/DL (ref 0.76–1.46)
TSH SERPL DL<=0.005 MIU/L-ACNC: 0.37 MU/L (ref 0.4–4)

## 2019-12-27 ASSESSMENT — PAIN SCALES - GENERAL: PAINLEVEL: NO PAIN (0)

## 2019-12-27 NOTE — PATIENT INSTRUCTIONS
- lab today  - schedule ultrasound thyroid at your convenience    If you have any questions, please do not hesitate to call clinic line at [phone] and ask for Endocrinology clinic.  If you need to fax, please fax to clinic fax number at [phone]    After clinic hours or weekends, please contact [phone] and ask for Endocrinologist-on call      Sincerely,    Ronald Aguilera MD  Endocrinology

## 2019-12-27 NOTE — LETTER
12/27/2019       RE: Vianey Ryan  [address] [facility] [address]     Dear Colleague,    Thank you for referring your patient, Vianey Ryan, to the [facility] ENDOCRINOLOGY at [facility]. Please see a copy of my visit note below.    Diabetes, Endocrinology and Metabolism Clinic       Reason for visit: follow up subclinical hyperthyroidism and multinodular goiter.    History of presenting illness:  Ms. Ryan is a 44 years old female with history of Raynaud's phenomenon, basal cell carcinoma, irritable bowel syndrome who is here for follow up subclinical hyperthyroidism and multinodular goiter. Previously seen in August 2017.    1) Subclinical hyperthyroidism: She was noted to have abnormal thyroid function tests (low TSH, normal free T4) and significant weight loss after she gave birth to her first child in Jan 2006. This pregnancy was complicated by severe preeclampsia that resolved after delivery. Her thyroid function tests normalized roughly 2.5 years after her first delivery. However, after delivery of her second child in August 2011, she once again had abnormal thyroid function tests (low TSH, normal free T4). Since then, her TSH has remained slightly depressed (0.22 to 0.39) with normal free T4 levels.     When I saw her in 2013, TFT was normalized. However, in 2014, TSH was slightly suppressed with normal FT4. She was pregnant with her third pregnancy and gave birth in April 2015. Since then, she continued to have mild suppressed TSH with normal FT4 and TT3. TSI and TPO Abs were all negative.    2) multinodular goiter: She also had thyroid ultrasound in 11/2013 showed multinodular goiter ranged from 5-10 mm. She did have FNA of left #5 and right#2 nodule in 2014. The left #5 was benign but the right #2 showed AUS with negative mutation for gene specific for thyroid cancer but it was positive for TSHR which reported primarily in benign nodule (please see  "detail in result section)    She has been doing well. She came today for check-up. She continues to have similar symptoms of insomnia, sweating, cold intolerance and loose stool. All her symptoms are off and on. She she denies any . She reports feeling of intermittent neck pressure but no neck pain, difficulty swallowing, or changes in her voice. She denied any fevers, chills, chest pain or discomfort, or shortness of breath, constipation. She sleeps well.     Past Medical History:  Subclinical hyperthyroidism  Multinodular goiter  G3, P3  Severe preeclampsia during 1st pregnancy  Depression  Raynaud's   Basal cell carcinoma, superior Left breast  Basal cell carcinoma, superior Right forehead  Irritable bowel syndrome  Hx of ovarian cyst  Metrorrhagia    Past Surgical History:  Tonsilectomy   section x 2  Mohs procedure  D & C  Colonoscopy     Medications:  No current outpatient medications on file.      Allergies:  Allergies   Allergen Reactions     Nkda [No Known Drug Allergies]      Family History:  Family history of Thyroid disease: Graves' disease (paternal grandmother)  History of Thyroid Cancer: none    Colon cancer - father, uncle, grandfather  HTN - maternal/paternal  Lupus - cousin on maternal side  Type I diabetes mellitus - maternal    Patient has 1 sister who is healthy    Social History:  , 2 children (7 and 2), , non-smoker, non-drinker, no illicit drug use    Review of System:  10 point ROS was done and was negative except mention above.     Physical Examination:  Vital signs:   /75   Pulse 65   Wt 63.7 kg (140 lb 6.4 oz)   LMP 2016   BMI 24.87 kg/m     Estimated body mass index is 24.87 kg/m  as calculated from the following:    Height as of 3/20/19: 1.6 m (5' 3\").    Weight as of this encounter: 63.7 kg (140 lb 6.4 oz).  Gen: NAD, comfortable  HEENT: Head normal, no lid lag, retraction, no proptosis  NECK: Supple, thyroid is minimally diffusely enlarged " particularly on the right side. No palpable masses or nodules in the thyroid bed; no cervical or supraclavicular adenopathy noted.  CVS: Regular rate and rhythm, normal S1, S2. No murmurs, rubs, or gallops.  LUNGS: Clear to ascultation bilaterally  GI: Soft, non-tender, normal bowel sounds, no organomegaly.  CNS: Fine tremor in hands, normal patellar reflexes  Extremities: no edema or rashes  PSYCH: Normal mood, pleasant affect    Lab:  4/29/2014  CYTOLOGIC INTERPRETATION:    A. Lymph node, right level 3, US-FNA:  - Polymorphous lymphoid elements  - No evidence of malignancy  Specimen Adequacy: Satisfactory for evaluation.    B. Thyroid, right#2, US-FNA:  - Atypia of Undetermined Significance  Specimen Adequacy: Satisfactory for evaluation.    The [place] Implied Risk of Malignancy and Recommended Clinical Management:  Atypia of Undetermined Significance has a 5-15% risk of malignancy, recommended repeat FNA.    The specimen will be submitted for molecular studies    C. Thyroid, left #5, US-FNA: Benign  - Consistent with a benign nodule (includes adenomatoid nodule, colloid nodule, etc.)  Specimen Adequacy: Satisfactory for evaluation.     Next generation sequencing panel for thyroid cancer (ThyroSeq).    RESULTS:  A. Mutations associated with high cancer risk are NOT identified.  B. 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      The tested sample is NEGATIVE for hotspots of the following gene(s):  AKT1, BRAF, CTNNB1, GNAS, HRAS, KRAS, NRAS, PIK3CA, PTEN, RET, TP53,  RET/PTC1, RET/PTC3, PAX8/PPARg    INTERPRETATION:  In this case, no mutations associated with well-established cancer risk were identified. In addition, the TSHR gene mutation p.D633H was identified with the allelic frequency of 6%, which corresponds to 12% of cells with heterozygous TSHR mutation in the collected sample. Somatic mutations of the thyrotropin receptor (TSHR) gene are known to frequently occur in  "autonomously functioning thyroid nodules (AFTN)(1-3). Mutations in this particular codon have been reported primarily in benign thyroid nodules and frequently in AFTN (4-7), although single cases of thyroid cancer carrying TSHR mutation in hyperfunctioning nodules have also been reported (3,8). In our experience, rare nodules with TSHR mutations found to be follicular cancers after excision had TSHR mutation present at high level, i.e., with allelic frequency of >30%. Therefore, based on the current knowledge, the results of molecular testing are consistent with a BENIGN nodule; however, these results should be interpreted with caution and in correlation with cytological, imaging, and other clinical and laboratory data.    COMMENT:    Complete report has been scanned into the patient's electronic medical record.    Imaging:  Thyroid uptake and scan 12/15/2017  FINDINGS: Heterogeneous uptake by the thyroid gland is seen. This can be seen with a goiter. No \"cold\" nodules are demonstrated. 24-hour uptake is calculated to be 14%.                                                                    IMPRESSION: 24-hour uptake is within normal limits.    Ultrasound 9/5/2017  FINDINGS: The right lobe of the thyroid gland measures 5.4 x 2.0 x 1.7 cm. The left lobe of the thyroid gland measures 5.9 x 2.0 x 2.0 cm.  The isthmus measures 0.4 cm.  Multiple focal findings are seen in the right lobe. A cystic and solid tiny nodule in the mid to lower pole measures 0.5 x 0.4 x 0.4 cm. A predominantly solid nodule in the lower pole measures 0.9 x 0.5 x 0.7 cm. A solid nodule in the lower pole measures 0.8 x 0.4 x 0.7 cm.  There are multiple focal findings in the left lobe. There is a solid hypoechoic nodule in the upper pole that measures 0.8 x 0.4 x 0.8 cm. There is a solid hypoechoic nodule in the mid to upper pole that measures 0.8 x 0.6 x 1.0 cm. There is a cyst in the lower pole that measures 0.6 x 0.4 x 0.5 cm. There is a cystic " and solid nodule in the lower pole that measures 1.4 x 0.8 x 1.4 cm. There is a a cystic and solid nodule in the lower pole that measures 0.8 x 0.5 x 0.6 cm.                                                                 Assessment:  Ms. Ryan is a 44 year old female with a history of subclinical hyperthyroidism and multinodular goiter presents today for follow up.     1) Subclinical hyperthyroidism: her TSH was fluctuated and mildly suppressed. TSI and TPO were negative. Differential Dx includes seronegative Graves' disease vs. multinodular goiter. She is clinically euthyroid. I will repeat lab today.    2) multiple thyroid nodules: she has had multiple nodules that was biopsied in 4/2014. The left #5 was benign but the right #2 was AUS with TSHR mutation which is not commonly associated with cancer. She has no compressive symptoms. I will follow up US thyroid this year.    Plan:   - follow up TSH, FT4, TT3 today  - follow up US thyroid    Ronald Aguilera MD  Endocrinology  [phone]

## 2020-01-09 ENCOUNTER — HOSPITAL ENCOUNTER (OUTPATIENT)
Dept: ULTRASOUND IMAGING | Facility: CLINIC | Age: 45
Discharge: HOME | End: 2020-01-09
Attending: INTERNAL MEDICINE | Admitting: INTERNAL MEDICINE
Payer: COMMERCIAL

## 2020-01-09 DIAGNOSIS — E04.2 MULTINODULAR GOITER: ICD-10-CM

## 2020-01-09 PROCEDURE — 76536 US EXAM OF HEAD AND NECK: CPT

## 2020-04-13 ENCOUNTER — E-VISIT (OUTPATIENT)
Dept: FAMILY MEDICINE | Facility: CLINIC | Age: 45
End: 2020-04-13
Payer: COMMERCIAL

## 2020-04-13 DIAGNOSIS — M54.50 ACUTE LOW BACK PAIN WITHOUT SCIATICA, UNSPECIFIED BACK PAIN LATERALITY: Primary | ICD-10-CM

## 2020-04-13 PROCEDURE — 99422 OL DIG E/M SVC 11-20 MIN: CPT | Performed by: FAMILY MEDICINE

## 2020-04-13 RX ORDER — CYCLOBENZAPRINE HCL 10 MG
10 TABLET ORAL 3 TIMES DAILY PRN
Qty: 30 TABLET | Refills: 0 | Status: SHIPPED | OUTPATIENT
Start: 2020-04-13 | End: 2020-09-02

## 2020-04-13 NOTE — PATIENT INSTRUCTIONS
Thank you for choosing us for your care. I have placed an order for a prescription so that you can start treatment. View your full visit summary for details by clicking on the link below. Your pharmacist will able to address any questions you may have about the medication.      If you're not feeling better within 2-3 weeks please schedule an appointment.  You can schedule an appointment right here in [place], or call [phone]  If the visit is for the same symptoms as your e-visit, we'll refund the cost of your e-visit if seen within seven days.    Caring for Your Back    You are not alone.    Low back pain is very common. Nearly half of all adults will have low back pain in any given year. The good news is that back pain is rarely a danger to your health. Most people can manage their back pain on their own. About half of people start feeling better within two weeks. In 9 out of 10 cases, low back pain goes away or no longer limits daily activity within 6 weeks.     Your outlook is good!     Your symptoms tell us that your low back pain is most likely not a danger to you. Most of the time we will not know the exact cause of low back pain, even if you see a doctor or have an MRI. However, treatment can still work without knowing the cause of the pain. Less than 1 in 100 people need surgery for their back pain.     What can I do about my low back pain?     There are three basic things you can do to ease low back pain and help it go away.     Use heat or cold packs.    Take medicine as directed.    Use positions, movements and exercises.    Using heat or cold packs:    Try cold packs or gentle heat to ease your pain.  Use whichever gives you the most relief. Apply the cold pack or heat for 15 minutes at a time, as often as needed.    Taking medicine:    If taking over-the-counter medicine:    Take ibuprofen (Advil, Motrin) 600 mg three times a day as needed for pain.  OR    Take Aleve (naproxen) 220 to 440 mg two  times a day as needed for pain. If your doctor prescribed a muscle relaxant (cyclobenzaprine 10 mg.):    Take   to 1 tablet at bedtime.    Do not drive when taking this medicine. This drug may make you sleepy.     Using positions, movements and exercises:    Research tells us that moving your joints and muscles can help you recover from back pain. Such activity should be simple and gentle. Use the positions below as well as walking to help relieve your pain. Try taking a short walk every 3 to 4 hours during the day. Walk for a few minutes inside your home or take longer walks outside, on a treadmill or at a mall. Slowly increase the amount of time you walk. Expect discomfort when you begin, but it should lessen as your back starts to heal. When your back feels better, walk daily to keep your back and body healthy.    Finding a position that is comfortable:    When your back pain is new, certain positions will ease your pain. Gently try each of the positions below until you find one that is helpful. Once you find a position of comfort, use it as often as you like when you are resting. You will recover faster if you combine rest with activity.    * Lie on your back with your legs bent. You can do this by placing a pillow under your knees or lie on the floor and rest your lower legs on the seat of a chair.  * Lie on your side with your knees bent and place a pillow between your knees.    Lie on your stomach over pillows.       When should I call my doctor?    Your back pain should improve over the first couple of weeks. As it improves, you should be able to return to your normal activities.  But call your doctor if:      You have a sudden change in your ability to control your bladder or bowels.    You begin to feel tingling in your groin or legs.    The pain spreads down your leg and into your foot.    Your toes, feet or leg muscles begin to feel weak.    You feel generally unwell or sick.    Your pain gets  worse.    If you are deaf or hard of hearing, please let us know. We provide many free services including sign language interpreters, oral interpreters, TTYs, telephone amplifiers, note takers and written materials.    For informational purposes only. Not to replace the advice of your health care provider. Copyright   2013 [facility]. All rights reserved. [employer] 818253 - 04/13.

## 2020-09-02 ENCOUNTER — OFFICE VISIT (OUTPATIENT)
Dept: FAMILY MEDICINE | Facility: CLINIC | Age: 45
End: 2020-09-02
Payer: COMMERCIAL

## 2020-09-02 VITALS
TEMPERATURE: 97.9 F | HEART RATE: 84 BPM | RESPIRATION RATE: 16 BRPM | HEIGHT: 63 IN | BODY MASS INDEX: 24.52 KG/M2 | OXYGEN SATURATION: 98 % | WEIGHT: 138.4 LBS | SYSTOLIC BLOOD PRESSURE: 102 MMHG | DIASTOLIC BLOOD PRESSURE: 74 MMHG

## 2020-09-02 DIAGNOSIS — H61.23 BILATERAL IMPACTED CERUMEN: Primary | ICD-10-CM

## 2020-09-02 DIAGNOSIS — R06.83 SNORING: ICD-10-CM

## 2020-09-02 DIAGNOSIS — H91.93 DECREASED HEARING OF BOTH EARS: ICD-10-CM

## 2020-09-02 PROCEDURE — 99213 OFFICE O/P EST LOW 20 MIN: CPT | Mod: 25 | Performed by: NURSE PRACTITIONER

## 2020-09-02 PROCEDURE — 69210 REMOVE IMPACTED EAR WAX UNI: CPT | Mod: 50 | Performed by: NURSE PRACTITIONER

## 2020-09-02 ASSESSMENT — MIFFLIN-ST. JEOR: SCORE: 1246.91

## 2020-09-02 NOTE — PROGRESS NOTES
"Subjective     Vianey Ryan is a 45 year old female who presents to clinic today for the following health issues:    Chief Complaint   Patient presents with     Sleep Problem     For the past couple of months, she has woken up in the middle of the night  and has to grasp for air      Hearing Problem     Within the past 6 months, she has not been hearing certain things if you start a conversation off to the side. Has been needing to ask her  daughter to repeat herself.          HPI     Review of Systems   Constitutional, HEENT, cardiovascular, pulmonary, gi and gu systems are negative, except as otherwise noted.      Objective    /74 (BP Location: Right arm, Patient Position: Sitting, Cuff Size: Adult Regular)   Pulse 84   Temp 97.9  F (36.6  C) (Tympanic)   Resp 16   Ht 1.6 m (5' 3\")   Wt 62.8 kg (138 lb 6.4 oz)   LMP 08/12/2016   SpO2 98%   BMI 24.52 kg/m    Body mass index is 24.52 kg/m .  Physical Exam   GENERAL: healthy, alert and no distress  HENT: normal cephalic/atraumatic and both ears: partially occluded with wax  NEURO: Normal strength and tone, mentation intact and speech normal  PSYCH: mentation appears normal, affect normal/bright            Assessment & Plan     Bilateral impacted cerumen  Cerumenosis is noted.  Wax is removed by manual debridement. Instructions for home care to prevent wax buildup are given.  - REMOVE IMPACTED CERUMEN    Snoring    - SLEEP EVALUATION & MANAGEMENT REFERRAL - ADULT -[facility] [phone]  (Age 18 and up); Future    Decreased hearing of both ears    - AUDIOLOGY ADULT REFERRAL; Future       See Patient Instructions      YAO Pri[facility]    "

## 2020-09-17 ENCOUNTER — VIRTUAL VISIT (OUTPATIENT)
Dept: SLEEP MEDICINE | Facility: CLINIC | Age: 45
End: 2020-09-17
Attending: NURSE PRACTITIONER
Payer: COMMERCIAL

## 2020-09-17 VITALS — BODY MASS INDEX: 24.84 KG/M2 | HEIGHT: 62 IN | WEIGHT: 135 LBS

## 2020-09-17 DIAGNOSIS — R29.818 SUSPECTED SLEEP APNEA: Primary | ICD-10-CM

## 2020-09-17 DIAGNOSIS — R06.83 SNORING: ICD-10-CM

## 2020-09-17 DIAGNOSIS — R53.82 CHRONIC FATIGUE, UNSPECIFIED: ICD-10-CM

## 2020-09-17 PROCEDURE — 99204 OFFICE O/P NEW MOD 45 MIN: CPT | Mod: 95 | Performed by: INTERNAL MEDICINE

## 2020-09-17 ASSESSMENT — MIFFLIN-ST. JEOR: SCORE: 1215.61

## 2020-09-17 NOTE — PROGRESS NOTES
"Vianey Ryan is a 45 year old female who is being evaluated via a billable video visit.      The patient has been notified of following:     \"This video visit will be conducted via a call between you and your physician/provider. We have found that certain health care needs can be provided without the need for an in-person physical exam.  This service lets us provide the care you need with a video conversation.  If a prescription is necessary we can send it directly to your pharmacy.  If lab work is needed we can place an order for that and you can then stop by our lab to have the test done at a later time.    Video visits are billed at different rates depending on your insurance coverage.  Please reach out to your insurance provider with any questions.    If during the course of the call the physician/provider feels a video visit is not appropriate, you will not be charged for this service.\"    Patient has given verbal consent for Video visit? Yes  How would you like to obtain your AVS? MyChart  If you are dropped from the video visit, the video invite should be resent to: Text to cell phone: [phone]  Will anyone else be joining your video visit? No        Video-Visit Details    Type of service:  Video Visit    Video Start Time: 9:11 AM  Video End Time: 9:35 AM    Originating Location (pt. Location): Home    Distant Location (provider location):  [facility]     Platform used for Video Visit: Mahamed Jensen MD      Sleep Consultation:    Date on this visit: 9/17/2020    Vianey Ryan is sent by Mary Rodriguez for a sleep consultation regarding possible sleep apnea.    Primary Physician: Jose Hearn     Chief complaint: gasping episodes in sleep     Presenting History:     Vianey Ryan reports nightly snoring for more than 2 years. Recently, she experienced gasping episodes in sleep which has made her concerned.     Medical history is significant for subclinical " hyperthyroidism.     Vianey does snore every night. Patient does have a regular bed partner. There is report of snoring and gasping.  She does not have witnessed apneas. They never sleep separately.  Patient sleeps on her side. She denies no morning headaches and restless legs. Vianey has occasional night terrors, sleep walking and sleep talking and denies any bruxism, dream enactment, sleep paralysis, cataplexy and hypnogogic/hypnopompic hallucinations. Night terrors and sleep talking have been present since an early age and happen during periods of stress.     She has a history of insomnia that becomes exacerbated during fluctuations in thyroid function.     Vianey goes to sleep at 12:30 AM during the week. She wakes up at 7:00 AM without an alarm. She falls asleep in 15 minutes.  Vianey has difficulty falling asleep.  She wakes up 2-4 times a night for 5 minutes before falling back to sleep.  Vianey wakes up to external stimuli.  On weekends, Vianey goes to sleep at 1:00 AM.  She wakes up at 7:00 AM without an alarm. She falls asleep in 15 minutes.  Patient gets an average of 6-7 hours of sleep per night.     Patient does use electronics in bed and read in bed.     She works as a  for an insurance company.      Vianey denies difficulty breathing through her nose.      She feels tired during the day but denies excessive sleepiness. Patient's [place] Sleepiness score 7/24 consistent with no daytime sleepiness.      Vianey naps 0 times per week. She takes no inadvertant naps.  She denies closing eyes, dozing and falling asleep while driving. Patient was counseled on the importance of driving while alert, to pull over if drowsy, or nap before getting into the vehicle if sleepy.      She uses 2-3 sodas/day. Last caffeine intake is usually before 2 pm.    Allergies:    Allergies   Allergen Reactions     Nkda [No Known Drug Allergies]        Medications:    No current outpatient medications on file.       Problem  List:  Patient Active Problem List    Diagnosis Date Noted     Family history of malignant neoplasm of breast 09/20/2017     Priority: Medium     mother with triple negative Breast Ca IIA       Hyperthyroidism 05/20/2016     Priority: Medium     Goiter 01/15/2014     Priority: Medium     Problem list name updated by automated process. Provider to review       Abnormal thyroid function test 12/29/2011     Priority: Medium     CARDIOVASCULAR SCREENING; LDL GOAL LESS THAN 160 06/20/2011     Priority: Medium     IBS (irritable bowel syndrome) 10/18/2010     Priority: Medium     Basal cell carcinoma of forehead 03/06/2009     Priority: Medium     Dx'd 2/09 at [facility]-Dr. Jatin Marie; s/p MOHS excision of sclerosing BCC       health care home 05/30/2013     Priority: Low     EMERGENCY CARE PLAN  May 30, 2013: No current Care Coordination follow up planned. Please refer if Care Coordination services are needed.    Presenting Problem Signs and Symptoms Treatment Plan   Questions or concerns   during clinic hours   I will call my clinic directly:  [facility] [place] [address]  [phone].   Questions or concerns outside clinic hours   I will call the 24 hour nurse line at   [phone] or [facility].   Need to schedule an appointment   I will call the 24 hour scheduling team at [phone] or my clinic directly at [phone].    Same day treatment     I will call my clinic first, nurse line if after hours, urgent care and express care if needed.   Clinic care coordination services (regular clinic hours)     I will call a clinic care coordinator directly:     Gaston Carty RN  Mon, Tues, Fri - [phone]  Wed, Thurs - [phone]    Chloe Johnson :    [phone]    Or call my clinic at [phone] and ask to speak with care coordination.   Crisis Services: Behavioral or Mental Health  [facility] 24 Hour Phone Line  [phone]    [place] [address]  Hour [facility]  [phone]    [facility] [phone]    [facility]   [phone]       Emergency treatment -- Immediately    [facility]All 911               Past Medical/Surgical History:  Past Medical History:   Diagnosis Date     Allergy to cats      Anemia 4/3/2015     Basal cell carcinoma      Chickenpox      Depressive disorder, not elsewhere classified     Depression (non-psychotic)     Metrorrhagia 2010     Ovarian cyst 2008     PONV (postoperative nausea and vomiting)      Severe preeclampsia      Thyroid disease      Urinary tract infections      Past Surgical History:   Procedure Laterality Date     C/SECTION, LOW TRANSVERSE  2006    Severe pre-eclampsia      SECTION, TUBAL LIGATION, COMBINED N/A 4/3/2015    Procedure: COMBINED  SECTION, TUBAL LIGATION;  Surgeon: Tay Mosqueda MD;  Location: [facility]     COLONOSCOPY  2013    Procedure: COLONOSCOPY;  Colonoscopy;  Surgeon: Manan Rangel MD;  Location: [facility]     CYSTOSCOPY N/A 2016    Procedure: CYSTOSCOPY;  Surgeon: Tay Mosqueda MD;  Location: [facility]     D & C  8/6/10     HC MOHS HEAD/NCK/HND/FT/GEN 1ST STAGE UP T0 5 BLOCKS  09    Mohs excision right central forehead BCC     HYSTERECTOMY, PAP NO LONGER INDICATED  2016     HYSTEROSCOPY  8/6/10     LAPAROSCOPIC CHOLECYSTECTOMY N/A 2015    Procedure: LAPAROSCOPIC CHOLECYSTECTOMY;  Surgeon: Landon Schultz MD;  Location: [facility]     LAPAROSCOPIC HYSTERECTOMY TOTAL N/A 2016    Procedure: LAPAROSCOPIC HYSTERECTOMY TOTAL;  Surgeon: Tay Mosqueda MD;  Location: [facility]     MOHS MICROGRAPHIC PROCEDURE       TONSILLECTOMY         Social History:  Social History     Socioeconomic History     Marital status:      Spouse name: Andrea     Number of children: 3     Years of education: Not on file     Highest education level: Not on file   Occupational History     Occupation: ,  "Contract negotiation     Employer: [employer]     Employer: [employer]   Social Needs     Financial resource strain: Not on file     Food insecurity     Worry: Not on file     Inability: Not on file     Transportation needs     Medical: Not on file     Non-medical: Not on file   Tobacco Use     Smoking status: Never Smoker     Smokeless tobacco: Never Used   Substance and Sexual Activity     Alcohol use: No     Drug use: No     Sexual activity: Yes     Partners: Male     Birth control/protection: Female Surgical     Comment: tubal ligation   Lifestyle     Physical activity     Days per week: Not on file     Minutes per session: Not on file     Stress: Not on file   Relationships     Social connections     Talks on phone: Not on file     Gets together: Not on file     Attends Scientologist service: Not on file     Active member of club or organization: Not on file     Attends meetings of clubs or organizations: Not on file     Relationship status: Not on file     Intimate partner violence     Fear of current or ex partner: Not on file     Emotionally abused: Not on file     Physically abused: Not on file     Forced sexual activity: Not on file   Other Topics Concern     Parent/sibling w/ CABG, MI or angioplasty before 65F 55M? No   Social History Narrative     Not on file       Family History:  Family History   Problem Relation Age of Onset     Hypertension Father      Sleep Apnea Father      Hypertension Paternal Grandmother      Lipids Paternal Grandmother      Thyroid Disease Paternal Grandmother      Blood Disease Mother         Anemia     Diabetes Mother         \"borderline diabetic\"     Breast Cancer Mother 63        triple negative stage 2A     Alcohol/Drug Paternal Grandfather         alcohol     Cancer Maternal Grandfather         skin     Alzheimer Disease Maternal Grandmother      Father has sleep apnea.     Review of Systems:  A complete review of systems reviewed by me is negative with the exeption of " "what has been mentioned in the history of present illness.  CONSTITUTIONAL: NEGATIVE for weight gain/loss, fever, chills, sweats or night sweats, drug allergies.  EYES: NEGATIVE for changes in vision, blind spots, double vision.  ENT: NEGATIVE for ear pain, sore throat, sinus pain, post-nasal drip, runny nose, bloody nose  CARDIAC: NEGATIVE for fast heartbeats or fluttering in chest, chest pain or pressure, breathlessness when lying flat, swollen legs or swollen feet.  NEUROLOGIC: NEGATIVE headaches, weakness or numbness in the arms or legs.  DERMATOLOGIC: NEGATIVE for rashes, new moles or change in mole(s)  PULMONARY: NEGATIVE SOB at rest, SOB with activity, dry cough, productive cough, coughing up blood, wheezing or whistling when breathing.    GASTROINTESTINAL: NEGATIVE for nausea or vomitting, loose or watery stools, fat or grease in stools, constipation, abdominal pain, bowel movements black in color or blood noted.  GENITOURINARY: NEGATIVE for pain during urination, blood in urine, urinating more frequently than usual, irregular menstrual periods.  MUSCULOSKELETAL: NEGATIVE for muscle pain, bone or joint pain, swollen joints.  ENDOCRINE: NEGATIVE for increased thirst or urination, diabetes.  LYMPHATIC: NEGATIVE for swollen lymph nodes, lumps or bumps in the breasts or nipple discharge.    Physical Examination:  Vitals: Ht 1.575 m (5' 2\")   Wt 61.2 kg (135 lb)   LMP 08/12/2016   BMI 24.69 kg/m    BMI= Body mass index is 24.69 kg/m .         Van Total Score 9/17/2020   Total score - Van 7       ANUSHA Total Score: 12 (09/17/20 0900)    GENERAL APPEARANCE: healthy, alert, active and no distress  EYES: Eyes grossly normal to inspection  RESP: No dyspnea, mop cough   MS: extremities normal- no gross deformities noted  SKIN: no suspicious lesions or rashes  NEURO: mentation intact and speech normal  PSYCH: mentation appears normal and affect normal/bright    Impression/Plan:    1. To rule out obstructive " sleep apnea    - Patient is concerned about gasping episodes that she has experienced in her sleep. She has a history of disruptive snoring and daytime fatigue with an intermediate risk for sleep apnea. An overnight sleep study is recommended for further assessment.     Plan:     1. Home sleep apnea testing     Literature provided regarding sleep apnea.      She will follow up with me in approximately two weeks after her sleep study has been competed to review the results and discuss plan of care.       Polysomnography & HST reviewed.  Limitations of HST reviewed.   Obstructive sleep apnea reviewed.  Complications of untreated sleep apnea were reviewed.    I spent a total of 40 minutes for this appointment today which include time spent before, during and after the visit for patient care, counseling and coordination of care.      CC: Mary Rodriguez

## 2020-09-17 NOTE — PATIENT INSTRUCTIONS
Your BMI is Body mass index is 24.69 kg/m .  Weight management is a personal decision.  If you are interested in exploring weight loss strategies, the following discussion covers the approaches that may be successful. Body mass index (BMI) is one way to tell whether you are at a healthy weight, overweight, or obese. It measures your weight in relation to your height.  A BMI of 18.5 to 24.9 is in the healthy range. A person with a BMI of 25 to 29.9 is considered overweight, and someone with a BMI of 30 or greater is considered obese. More than two-thirds of American adults are considered overweight or obese.  Being overweight or obese increases the risk for further weight gain. Excess weight may lead to heart disease and diabetes.  Creating and following plans for healthy eating and physical activity may help you improve your health.  Weight control is part of healthy lifestyle and includes exercise, emotional health, and healthy eating habits. Careful eating habits lifelong are the mainstay of weight control. Though there are significant health benefits from weight loss, long-term weight loss with diet alone may be very difficult to achieve- studies show long-term success with dietary management in less than 10% of people. Attaining a healthy weight may be especially difficult to achieve in those with severe obesity. In some cases, medications, devices and surgical management might be considered.  What can you do?  If you are overweight or obese and are interested in methods for weight loss, you should discuss this with your provider.     Consider reducing daily calorie intake by 500 calories.     Keep a food journal.     Avoiding skipping meals, consider cutting portions instead.    Diet combined with exercise helps maintain muscle while optimizing fat loss. Strength training is particularly important for building and maintaining muscle mass. Exercise helps reduce stress, increase energy, and improves fitness.  Increasing exercise without diet control, however, may not burn enough calories to loose weight.       Start walking three days a week 10-20 minutes at a time    Work towards walking thirty minutes five days a week     Eventually, increase the speed of your walking for 1-2 minutes at time    In addition, we recommend that you review healthy lifestyles and methods for weight loss available through the National Institutes of Health patient information sites:  http://win.niddk.nih.gov/publications/index.htm    And look into health and wellness programs that may be available through your health insurance provider, employer, local community center, or ez club.

## 2020-09-28 ENCOUNTER — TELEPHONE (OUTPATIENT)
Dept: SLEEP MEDICINE | Facility: CLINIC | Age: 45
End: 2020-09-28

## 2020-10-20 ENCOUNTER — OFFICE VISIT (OUTPATIENT)
Dept: AUDIOLOGY | Facility: CLINIC | Age: 45
End: 2020-10-20
Payer: COMMERCIAL

## 2020-10-20 DIAGNOSIS — H91.92 HIGH FREQUENCY HEARING LOSS, LEFT: Primary | ICD-10-CM

## 2020-10-20 PROCEDURE — 92557 COMPREHENSIVE HEARING TEST: CPT | Performed by: AUDIOLOGIST

## 2020-10-20 PROCEDURE — 92550 TYMPANOMETRY & REFLEX THRESH: CPT | Performed by: AUDIOLOGIST

## 2020-10-20 PROCEDURE — 99207 PR NO CHARGE LOS: CPT | Performed by: AUDIOLOGIST

## 2020-10-20 NOTE — PROGRESS NOTES
AUDIOLOGY REPORT    SUBJECTIVE:  Vianey Ryan is a 45 year old female who was seen at [facility] for an audiologic evaluation, referred by Mary Rodriguez CNP.  The patient has been seen previously in this clinic in 2017 for assessment and results indicated normal hearing in the right ear with a normal sloping to moderate sensorineural hearing loss in the left ear. The patient reports her hearing is now symmetrical. She reports difficulty hearing soft speech and her family. The patient denies bilateral tinnitus, bilateral otalgia, family history of hearing loss and history of noise exposure.     OBJECTIVE:  Abuse Screening:  Do you feel unsafe at home or work/school? No   Do you feel threatened by someone? No   Does anyone try to keep you from having contact with others, or doing things outside of your home? No   Physical signs of abuse present? No      Otoscopic exam indicates ears are clear of cerumen bilaterally       Pure Tone Thresholds assessed using conventional audiometry with good  reliability from 250-8000 Hz bilaterally using circumaural headphones     RIGHT:  normal and borderline-normal hearing thresholds    LEFT:    borderline-normal and mild hearing thesholds    Tympanogram:    RIGHT: normal eardrum mobility    LEFT:   normal eardrum mobility    Reflexes (reported by stimulus ear 1000 Hz):     RIGHT: Ipsilateral is present    RIGHT: Contralateral is present    LEFT: Ipsilateral is present    LEFT: Contralateral is present    Speech Reception Threshold:    RIGHT: 25 dB HL    LEFT:   25 dB HL  Word Recognition Score:     RIGHT: 100% at 65 dB HL using NU-6 recorded word list.    LEFT:   100% at 65 dB HL using NU-6 recorded word list.      ASSESSMENT:   Borderline normal hearing in the right ear with borderline normal to mild high tone loss at 8000 Hz in the left ear.     Compared to patient's previous audiogram dated 4/11/2017, hearing has decreased 10 dB across many  frequencies in the right ear and improved slightly in the left ear. Today s results were discussed with the patient in detail.     PLAN: It is recommended that the patient return if notes changes in her hearing.. Patient was counseled regarding hearing loss and impact on communication.    Please call this clinic with questions regarding these results or recommendations.        Мария Velazquez M.A. AL-AAA  Clinical audiologist [place] [facility] [id number]  10/20/2020

## 2020-10-28 ENCOUNTER — IMMUNIZATION (OUTPATIENT)
Dept: FAMILY MEDICINE | Facility: CLINIC | Age: 45
End: 2020-10-28
Payer: COMMERCIAL

## 2020-10-28 PROCEDURE — G0008 ADMIN INFLUENZA VIRUS VAC: HCPCS

## 2020-10-28 PROCEDURE — 90686 IIV4 VACC NO PRSV 0.5 ML IM: CPT

## 2020-11-29 ENCOUNTER — HEALTH MAINTENANCE LETTER (OUTPATIENT)
Age: 45
End: 2020-11-29

## 2021-04-06 ENCOUNTER — OFFICE VISIT (OUTPATIENT)
Dept: OBGYN | Facility: CLINIC | Age: 46
End: 2021-04-06
Payer: COMMERCIAL

## 2021-04-06 VITALS
HEIGHT: 63 IN | BODY MASS INDEX: 24.84 KG/M2 | TEMPERATURE: 98.3 F | RESPIRATION RATE: 16 BRPM | SYSTOLIC BLOOD PRESSURE: 134 MMHG | HEART RATE: 73 BPM | WEIGHT: 140.2 LBS | DIASTOLIC BLOOD PRESSURE: 89 MMHG

## 2021-04-06 DIAGNOSIS — Z00.00 ROUTINE GENERAL MEDICAL EXAMINATION AT A HEALTH CARE FACILITY: Primary | ICD-10-CM

## 2021-04-06 PROCEDURE — 99396 PREV VISIT EST AGE 40-64: CPT | Performed by: OBSTETRICS & GYNECOLOGY

## 2021-04-06 ASSESSMENT — MIFFLIN-ST. JEOR: SCORE: 1251.1

## 2021-04-06 NOTE — PROGRESS NOTES
SUBJECTIVE:   CC: Vianey Ryan is an 45 year old woman who presents for preventive health visit.       Patient has been advised of split billing requirements and indicates understanding: Yes  Healthy Habits:    Do you get at least three servings of calcium containing foods daily (dairy, green leafy vegetables, etc.)? yes    Amount of exercise or daily activities, outside of work: 7 day(s) per week    Problems taking medications regularly No    Medication side effects: No    Have you had an eye exam in the past two years? yes    Do you see a dentist twice per year? yes    Do you have sleep apnea, excessive snoring or daytime drowsiness?yes          Today's PHQ-2 Score:   PHQ-2 ( 1999 [employer]) 4/6/2021 3/20/2019   Q1: Little interest or pleasure in doing things 0 0   Q2: Feeling down, depressed or hopeless 0 0   PHQ-2 Score 0 0       Abuse: Current or Past(Physical, Sexual or Emotional)- No  Do you feel safe in your environment? Yes    Have you ever done Advance Care Planning? (For example, a Health Directive, POLST, or a discussion with a medical provider or your loved ones about your wishes): No, advance care planning information given to patient to review.  Advanced care planning was discussed at today's visit.    Social History     Tobacco Use     Smoking status: Never Smoker     Smokeless tobacco: Never Used   Substance Use Topics     Alcohol use: No     If you drink alcohol do you typically have >3 drinks per day or >7 drinks per week? No                     Reviewed orders with patient.  Reviewed health maintenance and updated orders accordingly - Yes  BP Readings from Last 3 Encounters:   04/06/21 134/89   09/02/20 102/74   12/27/19 116/75    Wt Readings from Last 3 Encounters:   04/06/21 63.6 kg (140 lb 3.2 oz)   09/17/20 61.2 kg (135 lb)   09/02/20 62.8 kg (138 lb 6.4 oz)                  Patient Active Problem List   Diagnosis     Basal cell carcinoma of forehead     IBS (irritable bowel syndrome)      "CARDIOVASCULAR SCREENING; LDL GOAL LESS THAN 160     Abnormal thyroid function test     health care home     Goiter     Hyperthyroidism     Family history of malignant neoplasm of breast     Past Surgical History:   Procedure Laterality Date     C/SECTION, LOW TRANSVERSE  2006    Severe pre-eclampsia      SECTION, TUBAL LIGATION, COMBINED N/A 4/3/2015    Procedure: COMBINED  SECTION, TUBAL LIGATION;  Surgeon: Tay Mosqueda MD;  Location: [facility]     COLONOSCOPY  2013    Procedure: COLONOSCOPY;  Colonoscopy;  Surgeon: Manan Rangel MD;  Location: [facility]     CYSTOSCOPY N/A 2016    Procedure: CYSTOSCOPY;  Surgeon: Tay Mosqueda MD;  Location: [facility]     D & C  8/6/10     HC MOHS HEAD/NCK/HND/FT/GEN 1ST STAGE UP T0 5 BLOCKS  09    Mohs excision right central forehead BCC     HYSTERECTOMY, PAP NO LONGER INDICATED  2016     HYSTEROSCOPY  8/6/10     LAPAROSCOPIC CHOLECYSTECTOMY N/A 2015    Procedure: LAPAROSCOPIC CHOLECYSTECTOMY;  Surgeon: Landon Schultz MD;  Location: [facility]     LAPAROSCOPIC HYSTERECTOMY TOTAL N/A 2016    Procedure: LAPAROSCOPIC HYSTERECTOMY TOTAL;  Surgeon: Tay Mosqueda MD;  Location: [facility]     MOHS MICROGRAPHIC PROCEDURE       TONSILLECTOMY         Social History     Tobacco Use     Smoking status: Never Smoker     Smokeless tobacco: Never Used   Substance Use Topics     Alcohol use: No     Family History   Problem Relation Age of Onset     Hypertension Father      Sleep Apnea Father      Hypertension Paternal Grandmother      Lipids Paternal Grandmother      Thyroid Disease Paternal Grandmother      Blood Disease Mother         Anemia     Diabetes Mother         \"borderline diabetic\"     Breast Cancer Mother 63        triple negative stage 2A     Alcohol/Drug Paternal Grandfather         alcohol     Cancer Maternal Grandfather         skin     Alzheimer Disease Maternal Grandmother          No current outpatient medications on " file.     Allergies   Allergen Reactions     Nkda [No Known Drug Allergies]        FSH-7: No flowsheet data found.    Mammogram Screening - Offered annual screening and updated Health Maintenance for [place] plan based on risk factor consideration    Pertinent mammograms are reviewed under the imaging tab.    Pertinent mammograms are reviewed under the imaging tab.  History of abnormal Pap smear: Status post benign hysterectomy. Health Maintenance and Surgical History updated.  PAP / HPV 1/15/2014 2011 3/6/2009   PAP NIL NIL NIL     Reviewed and updated as needed this visit by clinical staff  Tobacco  Allergies  Meds   Med Hx  Surg Hx  Fam Hx  Soc Hx        Reviewed and updated as needed this visit by Provider                Past Medical History:   Diagnosis Date     Allergy to cats      Anemia 4/3/2015     Basal cell carcinoma      Chickenpox      Depressive disorder, not elsewhere classified     Depression (non-psychotic)     Metrorrhagia 2010     Ovarian cyst 2008     PONV (postoperative nausea and vomiting)      Severe preeclampsia      Thyroid disease      Urinary tract infections       Past Surgical History:   Procedure Laterality Date     C/SECTION, LOW TRANSVERSE  2006    Severe pre-eclampsia      SECTION, TUBAL LIGATION, COMBINED N/A 4/3/2015    Procedure: COMBINED  SECTION, TUBAL LIGATION;  Surgeon: Tay Mosqueda MD;  Location: [facility]     COLONOSCOPY  2013    Procedure: COLONOSCOPY;  Colonoscopy;  Surgeon: Manan Rangel MD;  [facility]     CYSTOSCOPY N/A 2016    Procedure: CYSTOSCOPY;  Surgeon: Tay Mosqueda MD;  Location: [facility]     D & C  8/6/10     HC MOHS HEAD/NCK/HND/FT/GEN 1ST STAGE UP T0 5 BLOCKS  09    Mohs excision right central forehead BCC     HYSTERECTOMY, PAP NO LONGER INDICATED  2016     HYSTEROSCOPY  8/6/10     LAPAROSCOPIC CHOLECYSTECTOMY N/A 2015    Procedure: LAPAROSCOPIC CHOLECYSTECTOMY;   "Surgeon: Landon Schultz MD;  Location: [facility]     LAPAROSCOPIC HYSTERECTOMY TOTAL N/A 2016    Procedure: LAPAROSCOPIC HYSTERECTOMY TOTAL;  Surgeon: Tay Mosqueda MD;  Location: [facility]     MOHS MICROGRAPHIC PROCEDURE       TONSILLECTOMY       OB History    Para Term  AB Living   4 3 2 1 1 3   SAB TAB Ectopic Multiple Live Births   1 0 0 0 3      # Outcome Date GA Lbr Fredy/2nd Weight Sex Delivery Anes PTL Lv   4 Term 04/03/15 37w5d  3.43 kg (7 lb 9 oz) M CS-LTranv Spinal N NEERAJ      Name: Fausto      Apgar1: 8  Apgar5: 8   3 Term 11 39w0d  3.487 kg (7 lb 11 oz) F CS-Unspec   NEERAJ      Birth Comments: anemia      Name: Teresa      Apgar1: 9  Apgar5: 9   2  06 35w4d 03:00 3.175 kg (7 lb) F CS   NEERAJ      Birth Comments: CS - severe pre-eclampsia.       Name: Cindy       Apgar1: 8  Apgar5: 9   1 SAB 04 6w0d    SAB   DEC       ROS:  CONSTITUTIONAL: NEGATIVE for fever, chills, change in weight  INTEGUMENTARU/SKIN: NEGATIVE for worrisome rashes, moles or lesions  EYES: NEGATIVE for vision changes or irritation  ENT: NEGATIVE for ear, mouth and throat problems  RESP: NEGATIVE for significant cough or SOB  BREAST: NEGATIVE for masses, tenderness or discharge  CV: NEGATIVE for chest pain, palpitations or peripheral edema  GI: NEGATIVE for nausea, abdominal pain, heartburn, or change in bowel habits  : NEGATIVE for unusual urinary or vaginal symptoms. Periods are regular.  MUSCULOSKELETAL: NEGATIVE for significant arthralgias or myalgia  NEURO: NEGATIVE for weakness, dizziness or paresthesias  ENDOCRINE: NEGATIVE for temperature intolerance, skin/hair changes  HEME/ALLERGY/IMMUNE: NEGATIVE for bleeding problems  PSYCHIATRIC: NEGATIVE for changes in mood or affect    OBJECTIVE:   /89 (BP Location: Right arm, Cuff Size: Adult Regular)   Pulse 73   Temp 98.3  F (36.8  C)   Resp 16   Ht 1.594 m (5' 2.75\")   Wt 63.6 kg (140 lb 3.2 oz)   LMP 2016   BMI 25.03 kg/m  " "  EXAM:  GENERAL: healthy, alert and no distress  EYES: Eyes grossly normal to inspection, PERRL and conjunctivae and sclerae normal  NECK: no adenopathy, no asymmetry, masses, or scars and thyroid normal to palpation  RESP: lungs clear to auscultation - no rales, rhonchi or wheezes  BREAST: normal without masses, tenderness or nipple discharge and no palpable axillary masses or adenopathy  CV: regular rate and rhythm, normal S1 S2, no S3 or S4, no murmur, click or rub, no peripheral edema and peripheral pulses strong  ABDOMEN: soft, nontender, no hepatosplenomegaly, no masses and bowel sounds normal   (female): normal female external genitalia, normal urethral meatus , vaginal mucosa pink, moist, well rugated and normal post-hysterectomy exam without masses.   MS: no gross musculoskeletal defects noted, no edema  SKIN: no suspicious lesions or rashes  NEURO: Normal strength and tone, mentation intact and speech normal  PSYCH: mentation appears normal, affect normal/bright    Diagnostic Test Results:  Labs reviewed in Epic    ASSESSMENT/PLAN:   1. Routine general medical examination at a health care facility  Normal exam  - *MA Screening Digital Bilateral; Future  - Glucose; Future  - Lipid Profile; Future  - TSH with free T4 reflex; Future    Patient has been advised of split billing requirements and indicates understanding: Yes  COUNSELING:   Reviewed preventive health counseling, as reflected in patient instructions       Regular exercise       Healthy diet/nutrition    Estimated body mass index is 25.03 kg/m  as calculated from the following:    Height as of this encounter: 1.594 m (5' 2.75\").    Weight as of this encounter: 63.6 kg (140 lb 3.2 oz).        She reports that she has never smoked. She has never used smokeless tobacco.      Counseling Resources:  ATP IV Guidelines  Pooled Cohorts Equation Calculator  Breast Cancer Risk Calculator  BRCA-Related Cancer Risk Assessment: FHS-7 Tool  FRAX Risk " Assessment  ICSI Preventive Guidelines  Dietary Guidelines for Americans, 2010  USDA's MyPlate  ASA Prophylaxis  Lung CA Screening    Tay Mosqueda MD  [facility]

## 2021-04-20 DIAGNOSIS — Z00.00 ROUTINE GENERAL MEDICAL EXAMINATION AT A HEALTH CARE FACILITY: ICD-10-CM

## 2021-04-20 LAB
CHOLEST SERPL-MCNC: 132 MG/DL
GLUCOSE SERPL-MCNC: 89 MG/DL (ref 70–99)
HDLC SERPL-MCNC: 64 MG/DL
LDLC SERPL CALC-MCNC: 54 MG/DL
NONHDLC SERPL-MCNC: 68 MG/DL
TRIGL SERPL-MCNC: 69 MG/DL
TSH SERPL DL<=0.005 MIU/L-ACNC: 0.79 MU/L (ref 0.4–4)

## 2021-04-20 PROCEDURE — 82947 ASSAY GLUCOSE BLOOD QUANT: CPT | Performed by: OBSTETRICS & GYNECOLOGY

## 2021-04-20 PROCEDURE — 36415 COLL VENOUS BLD VENIPUNCTURE: CPT | Performed by: OBSTETRICS & GYNECOLOGY

## 2021-04-20 PROCEDURE — 84443 ASSAY THYROID STIM HORMONE: CPT | Performed by: OBSTETRICS & GYNECOLOGY

## 2021-04-20 PROCEDURE — 80061 LIPID PANEL: CPT | Performed by: OBSTETRICS & GYNECOLOGY

## 2021-06-03 ENCOUNTER — HOSPITAL ENCOUNTER (OUTPATIENT)
Dept: MAMMOGRAPHY | Facility: CLINIC | Age: 46
Discharge: HOME | End: 2021-06-03
Attending: OBSTETRICS & GYNECOLOGY | Admitting: OBSTETRICS & GYNECOLOGY
Payer: COMMERCIAL

## 2021-06-03 DIAGNOSIS — Z12.31 VISIT FOR SCREENING MAMMOGRAM: ICD-10-CM

## 2021-06-03 PROCEDURE — 77063 BREAST TOMOSYNTHESIS BI: CPT

## 2021-06-05 ENCOUNTER — APPOINTMENT (OUTPATIENT)
Dept: URGENT CARE | Facility: CLINIC | Age: 46
End: 2021-06-05
Payer: COMMERCIAL

## 2021-06-05 ENCOUNTER — HOSPITAL ENCOUNTER (EMERGENCY)
Facility: CLINIC | Age: 46
Discharge: HOME | End: 2021-06-05
Attending: PHYSICIAN ASSISTANT | Admitting: PHYSICIAN ASSISTANT
Payer: COMMERCIAL

## 2021-06-05 VITALS
TEMPERATURE: 98.2 F | HEIGHT: 63 IN | HEART RATE: 73 BPM | BODY MASS INDEX: 24.45 KG/M2 | WEIGHT: 138 LBS | OXYGEN SATURATION: 99 % | DIASTOLIC BLOOD PRESSURE: 93 MMHG | SYSTOLIC BLOOD PRESSURE: 130 MMHG | RESPIRATION RATE: 16 BRPM

## 2021-06-05 DIAGNOSIS — N39.0 URINARY TRACT INFECTION: ICD-10-CM

## 2021-06-05 LAB
ALBUMIN UR-MCNC: NEGATIVE MG/DL
APPEARANCE UR: ABNORMAL
BACTERIA #/AREA URNS HPF: ABNORMAL /HPF
BILIRUB UR QL STRIP: NEGATIVE
COLOR UR AUTO: YELLOW
GLUCOSE UR STRIP-MCNC: NEGATIVE MG/DL
HGB UR QL STRIP: ABNORMAL
KETONES UR STRIP-MCNC: NEGATIVE MG/DL
LEUKOCYTE ESTERASE UR QL STRIP: ABNORMAL
MUCOUS THREADS #/AREA URNS LPF: PRESENT /LPF
NITRATE UR QL: NEGATIVE
PH UR STRIP: 6 PH (ref 5–7)
RBC #/AREA URNS AUTO: 19 /HPF (ref 0–2)
SOURCE: ABNORMAL
SP GR UR STRIP: 1.01 (ref 1–1.03)
SQUAMOUS #/AREA URNS AUTO: 1 /HPF (ref 0–1)
UROBILINOGEN UR STRIP-MCNC: 0 MG/DL (ref 0–2)
WBC #/AREA URNS AUTO: >182 /HPF (ref 0–5)
WBC CLUMPS #/AREA URNS HPF: PRESENT /HPF

## 2021-06-05 PROCEDURE — 99213 OFFICE O/P EST LOW 20 MIN: CPT | Performed by: PHYSICIAN ASSISTANT

## 2021-06-05 PROCEDURE — 87086 URINE CULTURE/COLONY COUNT: CPT | Performed by: PHYSICIAN ASSISTANT

## 2021-06-05 PROCEDURE — 81001 URINALYSIS AUTO W/SCOPE: CPT | Performed by: PHYSICIAN ASSISTANT

## 2021-06-05 PROCEDURE — G0463 HOSPITAL OUTPT CLINIC VISIT: HCPCS | Performed by: PHYSICIAN ASSISTANT

## 2021-06-05 PROCEDURE — 250N000013 HC RX MED GY IP 250 OP 250 PS 637: Performed by: PHYSICIAN ASSISTANT

## 2021-06-05 RX ORDER — SULFAMETHOXAZOLE/TRIMETHOPRIM 800-160 MG
1 TABLET ORAL 2 TIMES DAILY
Qty: 2 TABLET | Refills: 0 | Status: SHIPPED | OUTPATIENT
Start: 2021-06-05 | End: 2021-06-06

## 2021-06-05 RX ORDER — SULFAMETHOXAZOLE/TRIMETHOPRIM 800-160 MG
1 TABLET ORAL 2 TIMES DAILY
Qty: 14 TABLET | Refills: 0 | Status: SHIPPED | OUTPATIENT
Start: 2021-06-05 | End: 2021-06-12

## 2021-06-05 RX ORDER — SULFAMETHOXAZOLE/TRIMETHOPRIM 800-160 MG
1 TABLET ORAL 2 TIMES DAILY
Status: DISCONTINUED | OUTPATIENT
Start: 2021-06-05 | End: 2021-06-05 | Stop reason: HOSPADM

## 2021-06-05 RX ADMIN — SULFAMETHOXAZOLE AND TRIMETHOPRIM 1 TABLET: 800; 160 TABLET ORAL at 20:55

## 2021-06-05 ASSESSMENT — ENCOUNTER SYMPTOMS
GASTROINTESTINAL NEGATIVE: 1
MUSCULOSKELETAL NEGATIVE: 1
FREQUENCY: 1
CONSTITUTIONAL NEGATIVE: 1
DYSURIA: 1
FEVER: 0

## 2021-06-05 ASSESSMENT — MIFFLIN-ST. JEOR: SCORE: 1245.09

## 2021-06-06 LAB
BACTERIA SPEC CULT: NORMAL
Lab: NORMAL
SPECIMEN SOURCE: NORMAL

## 2021-06-06 NOTE — ED PROVIDER NOTES
History     Chief Complaint   Patient presents with     UTI     HPI  Vianey Ryan is a 45 year old female who presents with complaints of dysuria and increased urinary urgency and frequency since this morning.  Patient states she did a virtual visit for a UTI and was started on Macrobid on May 26 and therefore did not have a urine culture obtained.  She states she completed her course of antibiotics with improvement and resolution of her symptoms however her urinary symptoms returned again this morning.  She reports having had prior urinary tract infections in the past and the symptoms are similar.  Denies fevers, chills, nausea, vomiting, abdominal pain, back or flank pain, or hematuria.  No vaginal symptoms such as itching or discharge.      Allergies:  Allergies   Allergen Reactions     Nkda [No Known Drug Allergies]        Problem List:    Patient Active Problem List    Diagnosis Date Noted     Family history of malignant neoplasm of breast 09/20/2017     Priority: Medium     mother with triple negative Breast Ca IIA       Hyperthyroidism 05/20/2016     Priority: Medium     Goiter 01/15/2014     Priority: Medium     Problem list name updated by automated process. Provider to review       Abnormal thyroid function test 12/29/2011     Priority: Medium     CARDIOVASCULAR SCREENING; LDL GOAL LESS THAN 160 06/20/2011     Priority: Medium     IBS (irritable bowel syndrome) 10/18/2010     Priority: Medium     Basal cell carcinoma of forehead 03/06/2009     Priority: Medium     Dx'd 2/09 at [facility]-Dr. Jatin Marie; s/p MOHS excision of sclerosing BCC       health care home 05/30/2013     Priority: Low     EMERGENCY CARE PLAN  May 30, 2013: No current Care Coordination follow up planned. Please refer if Care Coordination services are needed.    Presenting Problem Signs and Symptoms Treatment Plan   Questions or concerns   during clinic hours   I will call my clinic directly:  [facility] Sara Givens  [place] [id number]  [phone].   Questions or concerns outside clinic hours   I will call the 24 hour nurse line at   [phone] or [phone].   Need to schedule an appointment   I will call the 24 hour scheduling team at [phone] or my clinic directly at [phone].    Same day treatment     I will call my clinic first, nurse line if after hours, urgent care and express care if needed.   Clinic care coordination services (regular clinic hours)     I will call a clinic care coordinator directly:     Gaston Carty RN  Mon, Tues, Fri - [phone]  Wed, Thurs - [phone]    Chloe Johnson :    [phone]    Or call my clinic at [phone] and ask to speak with care coordination.   Crisis Services: Behavioral or Mental Health  [facility] 24 Hour Phone Line  [phone]    [facility] 24 Hour Crisis Services  [phone]    [facility] (Behavioral Health Providers) Network [phone]    [facility]   [phone]       Emergency treatment -- Immediately    CAll 911               Past Medical History:    Past Medical History:   Diagnosis Date     Allergy to cats      Anemia 4/3/2015     Basal cell carcinoma      Chickenpox      Depressive disorder, not elsewhere classified      Metrorrhagia 2010     Ovarian cyst 2008     PONV (postoperative nausea and vomiting)      Severe preeclampsia      Thyroid disease      Urinary tract infections        Past Surgical History:    Past Surgical History:   Procedure Laterality Date     C/SECTION, LOW TRANSVERSE  2006    Severe pre-eclampsia      SECTION, TUBAL LIGATION, COMBINED N/A 4/3/2015    Procedure: COMBINED  SECTION, TUBAL LIGATION;  Surgeon: Tay Mosqueda MD;  Location: [facility]     COLONOSCOPY  2013    Procedure: COLONOSCOPY;  Colonoscopy;  Surgeon: Manan Rangel MD;  [facility]: [facility]     CYSTOSCOPY N/A 2016    Procedure: CYSTOSCOPY;  Surgeon:  "Tay Mosqueda MD;  Location: [facility]     D & C  8/6/10      MOHS HEAD/NCK/HND/FT/GEN 1ST STAGE UP T0 5 BLOCKS  4/14/09    Mohs excision right central forehead BCC     HYSTERECTOMY, PAP NO LONGER INDICATED  08/29/2016     HYSTEROSCOPY  8/6/10     LAPAROSCOPIC CHOLECYSTECTOMY N/A 9/29/2015    Procedure: LAPAROSCOPIC CHOLECYSTECTOMY;  Surgeon: Landon Schultz MD;  Location: [facility]     LAPAROSCOPIC HYSTERECTOMY TOTAL N/A 8/29/2016    Procedure: LAPAROSCOPIC HYSTERECTOMY TOTAL;  Surgeon: Tay Mosqueda MD;  Location: [facility]     MOHS MICROGRAPHIC PROCEDURE       TONSILLECTOMY         Family History:    Family History   Problem Relation Age of Onset     Hypertension Father      Sleep Apnea Father      Hypertension Paternal Grandmother      Lipids Paternal Grandmother      Thyroid Disease Paternal Grandmother      Blood Disease Mother         Anemia     Diabetes Mother         \"borderline diabetic\"     Breast Cancer Mother 63        triple negative stage 2A     Alcohol/Drug Paternal Grandfather         alcohol     Cancer Maternal Grandfather         skin     Alzheimer Disease Maternal Grandmother        Social History:  Marital Status:   [2]  Social History     Tobacco Use     Smoking status: Never Smoker     Smokeless tobacco: Never Used   Substance Use Topics     Alcohol use: No     Drug use: No        Medications:    sulfamethoxazole-trimethoprim (BACTRIM DS) 800-160 MG tablet  sulfamethoxazole-trimethoprim (BACTRIM DS) 800-160 MG tablet          Review of Systems   Constitutional: Negative.  Negative for fever.   Gastrointestinal: Negative.    Genitourinary: Positive for dysuria, frequency and urgency.   Musculoskeletal: Negative.    Skin: Negative.    All other systems reviewed and are negative.      Physical Exam   BP: (!) 130/93  Pulse: 73  Temp: 98.2  F (36.8  C)  Resp: 16  Height: 160 cm (5' 3\")  Weight: 62.6 kg (138 lb)  SpO2: 99 %      Physical Exam  Constitutional:       General: She is not in " acute distress.     Appearance: Normal appearance. She is not ill-appearing, toxic-appearing or diaphoretic.   HENT:      Head: Normocephalic and atraumatic.   Eyes:      Extraocular Movements: Extraocular movements intact.      Conjunctiva/sclera: Conjunctivae normal.      Pupils: Pupils are equal, round, and reactive to light.   Neck:      Musculoskeletal: Normal range of motion and neck supple.   Cardiovascular:      Heart sounds: Normal heart sounds.   Pulmonary:      Effort: Pulmonary effort is normal.   Abdominal:      General: There is no distension.      Palpations: Abdomen is soft.      Tenderness: There is no abdominal tenderness. There is no right CVA tenderness, left CVA tenderness, guarding or rebound.   Musculoskeletal: Normal range of motion.   Skin:     General: Skin is warm and dry.   Neurological:      Mental Status: She is alert.         ED Course        Procedures    Results for orders placed or performed during the hospital encounter of 06/05/21 (from the past 24 hour(s))   UA reflex to Microscopic   Result Value Ref Range    Color Urine Yellow     Appearance Urine Slightly Cloudy     Glucose Urine Negative NEG^Negative mg/dL    Bilirubin Urine Negative NEG^Negative    Ketones Urine Negative NEG^Negative mg/dL    Specific Gravity Urine 1.008 1.003 - 1.035    Blood Urine Large (A) NEG^Negative    pH Urine 6.0 5.0 - 7.0 pH    Protein Albumin Urine Negative NEG^Negative mg/dL    Urobilinogen mg/dL 0.0 0.0 - 2.0 mg/dL    Nitrite Urine Negative NEG^Negative    Leukocyte Esterase Urine Large (A) NEG^Negative    Source Urine     RBC Urine 19 (H) 0 - 2 /HPF    WBC Urine >182 (H) 0 - 5 /HPF    WBC Clumps Present (A) NEG^Negative /HPF    Bacteria Urine Few (A) NEG^Negative /HPF    Squamous Epithelial /HPF Urine 1 0 - 1 /HPF    Mucous Urine Present (A) NEG^Negative /LPF       Medications   sulfamethoxazole-trimethoprim (BACTRIM DS) 800-160 MG per tablet 1 tablet (has no administration in time range)        Assessments & Plan (with Medical Decision Making)     Pt is a 45 year old female who presents with complaints of dysuria and increased urinary urgency and frequency since this morning.  Patient states she did a virtual visit for a UTI and was started on Macrobid on May 26 and therefore did not have a urine culture obtained.  She states she completed her course of antibiotics with improvement and resolution of her symptoms however her urinary symptoms returned again this morning.  She reports having had prior urinary tract infections in the past and the symptoms are similar.  Denies fevers, chills, nausea, vomiting, abdominal pain, back or flank pain, or hematuria.  No vaginal symptoms such as itching or discharge.    Pt is afebrile on arrival.  Exam as above.  Urinalysis is positive for large leuk esterase, WBC clumps, and > 182 WBCs.  Urine was sent for culture.  Discussed results with patient.  Encouraged symptomatic treatments at home.  Return precautions were reviewed.  Hand-outs were provided.    Patient was sent with Bactrim and was instructed to follow-up with PCP in 3-5 days for continued care and management or sooner if new or worsening symptoms.  She is to return to the ED for persistent and/or worsening symptoms.  Patient expressed understanding of the diagnosis and plan and was discharged home in good condition.    I have reviewed the nursing notes.    I have reviewed the findings, diagnosis, plan and need for follow up with the patient.    New Prescriptions    SULFAMETHOXAZOLE-TRIMETHOPRIM (BACTRIM DS) 800-160 MG TABLET    Take 1 tablet by mouth 2 times daily for 7 days    SULFAMETHOXAZOLE-TRIMETHOPRIM (BACTRIM DS) 800-160 MG TABLET    Take 1 tablet by mouth 2 times daily for 1 day       Final diagnoses:   Urinary tract infection       6/5/2021   [facility] EMERGENCY DEPT      Disclaimer:  This note consists of symbols derived from keyboarding, dictation and/or voice recognition  software.  As a result, there may be errors in the script that have gone undetected.  Please consider this when interpreting information found in this chart.     Roxie Mansfield PA-C  06/05/21 [id number]

## 2021-06-06 NOTE — ED TRIAGE NOTES
Frequency and urgency. Woke up with sx this AM. Recent UTI dx on the 26th of May via virtual visit. Finished antibiotics however was never tested. Appears well, NAD. No other complaints.

## 2021-06-17 ENCOUNTER — VIRTUAL VISIT (OUTPATIENT)
Dept: FAMILY MEDICINE | Facility: CLINIC | Age: 46
End: 2021-06-17
Payer: COMMERCIAL

## 2021-06-17 DIAGNOSIS — G89.29 CHRONIC BILATERAL LOW BACK PAIN WITHOUT SCIATICA: Primary | ICD-10-CM

## 2021-06-17 DIAGNOSIS — M54.50 CHRONIC BILATERAL LOW BACK PAIN WITHOUT SCIATICA: Primary | ICD-10-CM

## 2021-06-17 PROCEDURE — 99213 OFFICE O/P EST LOW 20 MIN: CPT | Mod: TEL | Performed by: NURSE PRACTITIONER

## 2021-06-17 RX ORDER — CYCLOBENZAPRINE HCL 10 MG
10 TABLET ORAL 3 TIMES DAILY PRN
Qty: 30 TABLET | Refills: 0 | Status: SHIPPED | OUTPATIENT
Start: 2021-06-17 | End: 2021-07-12

## 2021-06-17 NOTE — PROGRESS NOTES
Anne is a 45 year old who is being evaluated via a billable telephone visit.      What phone number would you like to be contacted at? [phone]  How would you like to obtain your AVS? Marily    Assessment & Plan     Chronic bilateral low back pain without sciatica  Ongoing intermittent pain for several years - likely MSK vs nerve impingement   - cyclobenzaprine (FLEXERIL) 10 MG tablet; Take 1 tablet (10 mg) by mouth 3 times daily as needed for muscle spasms  - Orthopedic  Referral; Future  - Recommend to continue with yoga/stretching/using ice /hear prn    No follow-ups on file.    YAO Al CNP  [facility]    Subjective   Anne is a 45 year old who presents for the following health issues  HPI     Back Pain  Onset/Duration: one week  Description:   Location of pain: low back bilateral  Character of pain: sharp and dull ache  Pain radiation: hands, arms  New numbness or weakness in legs, not attributed to pain: no   Intensity: Currently 3/10, At its worst 6/10  Progression of Symptoms: worsening  History:   Specific cause: turning/bending  Pain interferes with job: YES  History of back problems: recurrent self limited episodes of low back pain in the past  Any previous MRI or X-rays: None  Sees a specialist for back pain: No  Alleviating factors:   Improved by: heat and stretch    Precipitating factors:  Worsened by: Bending and Lying Flat  Therapies tried and outcome: heat and stretch    Accompanying Signs & Symptoms:  Risk of Fracture: None  Risk of Cauda Equina: None  Risk of Infection: None  Risk of Cancer: None  Risk of Ankylosing Spondylitis: Onset at age <35, male, AND morning back stiffness no            Review of Systems   Constitutional, HEENT, cardiovascular, pulmonary, GI, , musculoskeletal, neuro, skin, endocrine and psych systems are negative, except as otherwise noted.      Objective           Vitals:  No vitals were obtained today due to virtual  visit.    Physical Exam   healthy, alert and no distress  PSYCH: Alert and oriented times 3; coherent speech, normal   rate and volume, able to articulate logical thoughts, able   to abstract reason, no tangential thoughts, no hallucinations   or delusions  Her affect is normal  RESP: No cough, no audible wheezing, able to talk in full sentences  Remainder of exam unable to be completed due to telephone visits                Phone call duration:  12 Minutes

## 2021-07-08 NOTE — PROGRESS NOTES
Vianey Ryan  :  1975  DOS: 2021  MRN: [id number]    Medical Spine Clinic Visit    PCP: Jose Hearn     Vianey Ryan is a 46 year old female referred by Abby Yang for: acute low back pain    She has had intermittent low back pain for 15 yrs since pregnant.    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  She did not have substantial pain immediately but started to have a lot of pain in the next day - middle of night rolled over in sleep - woke up from pain - severe.    Pain is midline at or just below level of iliac crests.   Currently no pain in legs, no numbness or tingling. No constant weakness, but can feel weak due to pain.    Keeping things close is alleviating (e.g. not bending forward to pick things up or lifting heavy things extended out from her body)    Prolonged sitting is xacerbating -she feels heavy in tailbone.      Current pain medications:   - yoga, stretching  - has worked on strengthening throughout her life    Previous mediations:   - Flexeril 10mg TID PRN - No help    Other pertinent medications:  - none    Anticoagulants:  - none    Injections:   - no    History of Surgery in the painful area:   - no    Social History: young children. Works as an . Instructs yoga, aerobic conditioning, has a [employer].   Cabin in [facility] they love to hike.   is having a TKA next week    Review of Systems  Musculoskeletal: as above  Remainder of review of systems is negative including constitutional, CV, pulmonary, GI, Skin and Neurologic except as noted in HPI or medical history.    Past Medical History:   Diagnosis Date     Allergy to cats      Anemia 4/3/2015     Basal cell carcinoma      Chickenpox      Depressive disorder, not elsewhere classified     Depression (non-psychotic)     Metrorrhagia 2010     Ovarian cyst 2008     PONV (postoperative nausea  "and vomiting)      Severe preeclampsia      Thyroid disease      Urinary tract infections      Past Surgical History:   Procedure Laterality Date     C/SECTION, LOW TRANSVERSE  2006    Severe pre-eclampsia      SECTION, TUBAL LIGATION, COMBINED N/A 4/3/2015    Procedure: COMBINED  SECTION, TUBAL LIGATION;  Surgeon: Tay Mosqueda MD;  Location: [facility]     COLONOSCOPY  2013    Procedure: COLONOSCOPY;  Colonoscopy;  Surgeon: Manan Rangel MD;  Location: [facility]     CYSTOSCOPY N/A 2016    Procedure: CYSTOSCOPY;  Surgeon: Tay Mosqueda MD;  Location: [facility]     D & C  8/6/10     HC MOHS HEAD/NCK/HND/FT/GEN 1ST STAGE UP T0 5 BLOCKS  09    Mohs excision right central forehead BCC     HYSTERECTOMY, PAP NO LONGER INDICATED  2016     HYSTEROSCOPY  8/6/10     LAPAROSCOPIC CHOLECYSTECTOMY N/A 2015    Procedure: LAPAROSCOPIC CHOLECYSTECTOMY;  Surgeon: Landon Schultz MD;  Location: [facility]     LAPAROSCOPIC HYSTERECTOMY TOTAL N/A 2016    Procedure: LAPAROSCOPIC HYSTERECTOMY TOTAL;  Surgeon: Tay Mosqueda MD;  Location: [facility]     MOHS MICROGRAPHIC PROCEDURE       TONSILLECTOMY       Family History   Problem Relation Age of Onset     Hypertension Father      Sleep Apnea Father      Hypertension Paternal Grandmother      Lipids Paternal Grandmother      Thyroid Disease Paternal Grandmother      Blood Disease Mother         Anemia     Diabetes Mother         \"borderline diabetic\"     Breast Cancer Mother 63        triple negative stage 2A     Alcohol/Drug Paternal Grandfather         alcohol     Cancer Maternal Grandfather         skin     Alzheimer Disease Maternal Grandmother        Objective  /70   Pulse 73   LMP 2016       General: healthy, alert and in no distress      HEENT: no scleral icterus or conjunctival erythema     Skin: no suspicious lesions or rash. No jaundice.     CV: normal rate      Resp: normal respiratory effort without " conversational dyspnea     Psych: normal mood and affect      Gait: nonantalgic, appropriate coordination and balance     Neuro: normal light touch sensory exam of the extremities. Motor strength as noted below     Low back exam:    THORACIC/LUMBAR SPINE  Inspection:    No gross deformity/asymmetry, scapular winging  Palpation:    Tender about the left para lumbar muscles and right para lumbar muscles. Otherwise remainder of landmarks are nontender.  Range of Motion:     Lumbar flexion show full range    Lumbar extension show full range -exacerbates pain    Right rotation show full range -does not exacerbate pain any more than straight lumbar extension    Left rotation show full range -as above  Strength:    able to heel walk, able to toe walk, 5/5 - quadriceps, hamstrings, tibialis anterior, gastrocsoleus, and extensor hallicus longus  Special Tests:    Positive: None  Negative: BILATERAL straight leg raise (bilateral), femoral stretch test (bilateral), facet compression test (bilateral, lower lumbar facets), SI joint compression (bilateral), KELSEY (bilateral), FADIR (bilateral)    Reflexes:       patellar (L3, L4) symmetric normal       achilles tendons (S1) symmetric normal  no ankle clonus bilaterally    Sensation:      grossly intact throughout lower extremities    Skin:       well perfused       capillary refill brisk    Radiology:  No imaging available    Assessment:  1. Discogenic low back pain    2. Chronic bilateral low back pain without sciatica      46-year-old previously healthy and very active woman with a history of intermittent midline axial low back pain now with an episode of severe pain for the past 3 weeks.  Pain is most consistent with discogenic type pain given midline location, sudden onset, exacerbation with sudden movements including lumbar extension.    She is very interested in both assessing what may be causing her pain as well as developing long-term strategies for managing/preventing  the pain.    Plan:  Discussed the assessment with the patient.  Physical therapy referral  Over the counter medication: Acetaminophen (Tylenol) 1000mg every 6 hours with food (Maximum of 3000mg/day)  Ibuprofen (Advil) maximum of 800mg three times a day with food  MRI of the lumbar spine to evaluate for annular fissures, high intensity zone  Follow up: I will call with results of MRI, then likely follow-up as needed  Consider the back  book by Dr. Montez    BILLING TIME DOCUMENTATION:   The total TIME spent on this patient on the date of the encounter/appointment was 39 minutes.      TOTAL TIME includes:   Time spent preparing to see the patient (reviewing records and tests) - 2 min  Time spent face to face (or over the phone) with the patient - 28 min  Time spent ordering tests, medications, procedures and referrals - 0 min  Time spent Referring and communicating with other healthcare professionals - 0 min  Time spent documenting clinical information in Epic - 9 min     Rosa Ortiz MD  [facility]     Disclaimer: This note consists of symbols derived from keyboarding, dictation and/or voice recognition software. As a result, there may be errors in the script that have gone undetected. Please consider this when interpreting information found in this chart.

## 2021-07-12 ENCOUNTER — OFFICE VISIT (OUTPATIENT)
Dept: PALLIATIVE MEDICINE | Facility: CLINIC | Age: 46
End: 2021-07-12
Payer: COMMERCIAL

## 2021-07-12 VITALS — HEART RATE: 73 BPM | SYSTOLIC BLOOD PRESSURE: 113 MMHG | DIASTOLIC BLOOD PRESSURE: 70 MMHG

## 2021-07-12 DIAGNOSIS — G89.29 CHRONIC BILATERAL LOW BACK PAIN WITHOUT SCIATICA: ICD-10-CM

## 2021-07-12 DIAGNOSIS — M54.50 CHRONIC BILATERAL LOW BACK PAIN WITHOUT SCIATICA: ICD-10-CM

## 2021-07-12 DIAGNOSIS — M51.360 DISCOGENIC LOW BACK PAIN: Primary | ICD-10-CM

## 2021-07-12 PROCEDURE — 99204 OFFICE O/P NEW MOD 45 MIN: CPT | Performed by: STUDENT IN AN ORGANIZED HEALTH CARE EDUCATION/TRAINING PROGRAM

## 2021-07-12 ASSESSMENT — PAIN SCALES - GENERAL: PAINLEVEL: MILD PAIN (2)

## 2021-07-12 NOTE — PATIENT INSTRUCTIONS
I think your low back pain is most likely due to pain from a disc (annual fissure).     For this we will:     1. OK to take ibuprofen 800 mg and Tylenol 1000 mg up to 3 times a day.     2. Get an MRI of your lumbar spine.   [facility] [phone]- please call to schedule appointment    3. Schedule physical therapy. You will be called to schedule this.     Follow up:   - I will call you with the results of your MRI  - as needed    Rosa Ortiz MD  [facility]     ----------------------------------------------------------------  Clinic Number:  [phone]     Call with any questions about your care and for scheduling assistance.     Calls are returned Monday through Friday between 8 AM and 4:30 PM. We usually get back to you within 2 business days depending on the issue/request.    If we are prescribing your medications:    For opioid medication refills, call the clinic or send a [employer] message 7 days in advance.  Please include:    Name of requested medication    Name of the pharmacy.    For non-opioid medications, call your pharmacy directly to request a refill. Please allow 3-4 days to be processed.     Per [place] State Law:    All controlled substance prescriptions must be filled within 30 days of being written.      For those controlled substances allowing refills, pickup must occur within 30 days of last fill.      We believe regular attendance is key to your success in our program!      Any time you are unable to keep your appointment we ask that you call us at least 24 hours in advance to cancel.This will allow us to offer the appointment time to another patient.     Multiple missed appointments may lead to dismissal from the clinic.

## 2021-07-22 ENCOUNTER — HOSPITAL ENCOUNTER (OUTPATIENT)
Dept: PHYSICAL THERAPY | Facility: CLINIC | Age: 46
Setting detail: THERAPIES SERIES
End: 2021-07-22
Attending: STUDENT IN AN ORGANIZED HEALTH CARE EDUCATION/TRAINING PROGRAM
Payer: COMMERCIAL

## 2021-07-22 DIAGNOSIS — G89.29 CHRONIC BILATERAL LOW BACK PAIN WITHOUT SCIATICA: ICD-10-CM

## 2021-07-22 DIAGNOSIS — M51.360 DISCOGENIC LOW BACK PAIN: ICD-10-CM

## 2021-07-22 DIAGNOSIS — M54.50 CHRONIC BILATERAL LOW BACK PAIN WITHOUT SCIATICA: ICD-10-CM

## 2021-07-22 PROCEDURE — 97110 THERAPEUTIC EXERCISES: CPT | Mod: GP | Performed by: PHYSICAL THERAPIST

## 2021-07-22 PROCEDURE — 97161 PT EVAL LOW COMPLEX 20 MIN: CPT | Mod: GP | Performed by: PHYSICAL THERAPIST

## 2021-07-22 NOTE — PROGRESS NOTES
07/22/21 0700   General Information   Type of Visit Initial OP Ortho PT Evaluation   Start of Care Date 07/22/21   Referring Physician YAO Yoon CNP    Patient/Family Goals Statement to have 6 visits of PT for MRI; learn about how to heal LBP and avoid flare ups in the future   Orders Evaluate and Treat   Date of Order 07/12/21   Certification Required? No   Medical Diagnosis Discogenic low back pain; chronic bilateral low back pain without sciatica   Surgical/Medical history reviewed Yes   Precautions/Limitations no known precautions/limitations   Body Part(s)   Body Part(s) Lumbar Spine/SI   Presentation and Etiology   Pertinent history of current problem (include personal factors and/or comorbidities that impact the POC) Per chart review: patient reports chronic LBP since pregnancy 15 years prior.  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  Pt feels she can never sit in a chair too long.  During flare ups she won't be able to lift anything, needs to tuck pelvis under, and this last ~2 weeks.   Most recent flare up was the worst she had, and still feels effects.  Needs 6 PT before MRI.  Works at the [employer] and keeps fitness very up to date.  Yoga moves that feel good are legs up the wall posee.     Impairments A. Pain;D. Decreased ROM;E. Decreased flexibility;F. Decreased strength and endurance;G. Impaired balance;H. Impaired gait;K. Numbness   Functional Limitations perform activities of daily living;perform required work activities;perform desired leisure / sports activities   Symptom Location midline L4-5   How/Where did it occur From insidious onset   Onset date of current episode/exacerbation 05/22/21   Chronicity Recurrent   Pain rating (0-10 point scale) Best (/10);Worst (/10)   Best (/10) 0   Worst (/10) 2   Pain quality A. Sharp;B. Dull;C. Aching;E. Shooting;F. Stabbing   Frequency of pain/symptoms B. Intermittent   Pain/symptoms are: The same all  the time   Pain/symptoms exacerbated by A. Sitting;C. Lifting;G. Certain positions;J. ADL   Pain/symptoms eased by E. Changing positions;F. Certain positions   Progression of symptoms since onset: Worsened   Prior Level of Function   Prior Level of Function-Mobility Independent   Prior Level of Function-ADLs Independent   Current Level of Function   Current Community Support Family/friend caregiver   Patient role/employment history A. 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 No   Have you fallen and had an injury in the past year? No   Is patient a fall risk? No   Abuse Screen (yes response referral indicated)   Feels Unsafe at Home or Work/School no   Feels Threatened by Someone no   Does Anyone Try to Keep You From Having Contact with Others or Doing Things Outside Your Home? no   Physical Signs of Abuse Present no   Lumbar Spine/SI Objective Findings   Posture upright, minimal thoracic curvature   Flexion ROM fingertips to floor   Extension ROM 10% increased LBP   Right Side Bending % pain free   Left Side Bending ROM 75% increased left LBP   Hip Screen hypermobile pain free   Hip Abduction Strength right=3+/5; left=4/5   Planned Therapy Interventions   Planned Therapy Interventions ADL retraining;balance training;joint mobilization;manual therapy;motor coordination training;neuromuscular re-education;ROM;strengthening;stretching   Planned Modality Interventions   Planned Modality Interventions Cryotherapy   Clinical Impression   Criteria for Skilled Therapeutic Interventions Met yes, treatment indicated   PT Diagnosis chronic LBP L4-5 with flexion preference; limited thoracic mobility and weak glute med   Influenced by the following impairments pain; weakness; impaired mobility   Functional limitations  due to impairments difficulty with prolonged sitting, teaching ex class   Clinical Presentation Stable/Uncomplicated   Clinical Presentation Rationale (+) motivation; overall health  (-) chronic nature of sx   Clinical Decision Making (Complexity) Low complexity   Therapy Frequency 1 time/week   Predicted Duration of Therapy Intervention (days/wks) 6 weeks   Risk & Benefits of therapy have been explained Yes   Patient, Family & other staff in agreement with plan of care Yes   Clinical Impression Comments Anne arrives today with chronic recurrent LBP; she falls in flexion preference subgroup and presents with limited T spine mobility and glute med strength.  Pt will benefit from skilled PT to address the above impairments and functional limitations.   Education Assessment   Preferred Learning Style Listening;Demonstration;Pictures/video   Barriers to Learning No barriers   ORTHO GOALS   PT Ortho Eval Goals 1;2;3   Ortho Goal 1   Goal Description Patient will be able to sit for >=30 min without LBP.   Target Date 09/30/21   Ortho Goal 2   Goal Description Patient will have 5/5 satish hip ABD strength to stabilize the lumbar spine with ADL's.   Target Date 10/14/21   Ortho Goal 3   Goal Description Patient will be independent with her HEP to reduce future occurrence of pain and disability.   Target Date 08/12/21   Total Evaluation Time   PT Jena, Low Complexity Minutes (43430) 20       Candie Diez, PT, DTP, SCS  Doctor of Physical Therapy #[id number]  [facility]  [phone]  [email or web address]

## 2021-07-30 ENCOUNTER — HOSPITAL ENCOUNTER (OUTPATIENT)
Dept: PHYSICAL THERAPY | Facility: CLINIC | Age: 46
Setting detail: THERAPIES SERIES
End: 2021-07-30
Attending: STUDENT IN AN ORGANIZED HEALTH CARE EDUCATION/TRAINING PROGRAM
Payer: COMMERCIAL

## 2021-07-30 PROCEDURE — 97110 THERAPEUTIC EXERCISES: CPT | Mod: GP | Performed by: PHYSICAL THERAPIST

## 2021-07-30 PROCEDURE — 97140 MANUAL THERAPY 1/> REGIONS: CPT | Mod: GP | Performed by: PHYSICAL THERAPIST

## 2021-08-12 ENCOUNTER — HOSPITAL ENCOUNTER (OUTPATIENT)
Dept: PHYSICAL THERAPY | Facility: CLINIC | Age: 46
Setting detail: THERAPIES SERIES
End: 2021-08-12
Attending: STUDENT IN AN ORGANIZED HEALTH CARE EDUCATION/TRAINING PROGRAM
Payer: COMMERCIAL

## 2021-08-12 PROCEDURE — 97110 THERAPEUTIC EXERCISES: CPT | Mod: GP | Performed by: PHYSICAL THERAPIST

## 2021-08-12 PROCEDURE — 97140 MANUAL THERAPY 1/> REGIONS: CPT | Mod: GP | Performed by: PHYSICAL THERAPIST

## 2021-09-19 ENCOUNTER — HEALTH MAINTENANCE LETTER (OUTPATIENT)
Age: 46
End: 2021-09-19

## 2021-10-14 ENCOUNTER — IMMUNIZATION (OUTPATIENT)
Dept: FAMILY MEDICINE | Facility: CLINIC | Age: 46
End: 2021-10-14
Payer: COMMERCIAL

## 2021-10-14 DIAGNOSIS — Z23 NEED FOR PROPHYLACTIC VACCINATION AND INOCULATION AGAINST INFLUENZA: Primary | ICD-10-CM

## 2021-10-14 PROCEDURE — 90686 IIV4 VACC NO PRSV 0.5 ML IM: CPT

## 2021-10-14 PROCEDURE — G0008 ADMIN INFLUENZA VIRUS VAC: HCPCS

## 2022-02-01 ENCOUNTER — E-VISIT (OUTPATIENT)
Dept: FAMILY MEDICINE | Facility: CLINIC | Age: 47
End: 2022-02-01
Payer: COMMERCIAL

## 2022-02-01 DIAGNOSIS — N39.0 ACUTE UTI (URINARY TRACT INFECTION): Primary | ICD-10-CM

## 2022-02-01 PROCEDURE — 99421 OL DIG E/M SVC 5-10 MIN: CPT | Performed by: FAMILY MEDICINE

## 2022-02-01 RX ORDER — NITROFURANTOIN 25; 75 MG/1; MG/1
100 CAPSULE ORAL 2 TIMES DAILY
Qty: 10 CAPSULE | Refills: 0 | Status: SHIPPED | OUTPATIENT
Start: 2022-02-01 | End: 2022-02-06

## 2022-02-01 NOTE — PATIENT INSTRUCTIONS
Dear Vianey Ryan    After reviewing your responses, I've been able to diagnose you with a urinary tract infection, which is a common infection of the bladder with bacteria.  This is not a sexually transmitted infection, though urinating immediately after intercourse can help prevent infections.  Drinking lots of fluids is also helpful to clear your current infection and prevent the next one.      I have sent a prescription for antibiotics to your pharmacy to treat this infection.    It is important that you take all of your prescribed medication even if your symptoms are improving after a few doses.  Taking all of your medicine helps prevent the symptoms from returning.     If your symptoms worsen, you develop pain in your back or stomach, develop fevers, or are not improving in 5 days, please contact your primary care provider for an appointment or visit any of our convenient Walk-in or Urgent Care Centers to be seen, which can be found on our website here.    Thanks again for choosing us as your health care partner,    Veronica Griffiths MD    Urinary Tract Infections in Women  Urinary tract infections (UTIs) are most often caused by bacteria. These bacteria enter the urinary tract. The bacteria may come from inside the body. Or they may travel from the skin outside the rectum or vagina into the urethra. Female anatomy makes it easy for bacteria from the bowel to enter a woman s urinary tract, which is the most common source of UTI. This means women develop UTIs more often than men. Pain in or around the urinary tract is a common UTI symptom. But the only way to know for sure if you have a UTI for the healthcare provider to test your urine. The two tests that may be done are the urinalysis and urine culture.     Types of UTIs    Cystitis. A bladder infection (cystitis) is the most common UTI in women. You may have urgent or frequent need to pee. You may also have pain, burning when you pee, and bloody  urine.    Urethritis. This is an inflamed urethra, which is the tube that carries urine from the bladder to outside the body. You may have lower stomach or back pain. You may also have urgent or frequent need to pee.    Pyelonephritis. This is a kidney infection. If not treated, it can be serious and damage your kidneys. In severe cases, you may need to stay in the hospital. You may have a fever and lower back pain.    Medicines to treat a UTI  Most UTIs are treated with antibiotics. These kill the bacteria. The length of time you need to take them depends on the type of infection. It may be as short as 3 days. If you have repeated UTIs, you may need a low-dose antibiotic for several months. Take antibiotics exactly as directed. Don t stop taking them until all of the medicine is gone. If you stop taking the antibiotic too soon, the infection may not go away. You may also develop a resistance to the antibiotic. This can make it much harder to treat.   Lifestyle changes to treat and prevent UTIs   The lifestyle changes below will help get rid of your UTI. They may also help prevent future UTIs.     Drink plenty of fluids. This includes water, juice, or other caffeine-free drinks. Fluids help flush bacteria out of your body.    Empty your bladder. Always empty your bladder when you feel the urge to pee. And always pee before going to sleep. Urine that stays in your bladder can lead to infection. Try to pee before and after sex as well.    Practice good personal hygiene. Wipe yourself from front to back after using the toilet. This helps keep bacteria from getting into the urethra.    Use condoms during sex. These help prevent UTIs caused by sexually transmitted bacteria. Also don't use spermicides during sex. These can increase the risk for UTIs. Choose other forms of birth control instead. For women who tend to get UTIs after sex, a low-dose of a preventive antibiotic may be used. Be sure to discuss this option with  your healthcare provider.    Follow up with your healthcare provider as directed. He or she may test to make sure the infection has cleared. If needed, more treatment may be started.  [employer] last reviewed this educational content on 7/1/2019 2000-2021 [employer]. All rights reserved. This information is not intended as a substitute for professional medical care. Always follow your healthcare professional's instructions.

## 2022-02-20 ENCOUNTER — E-VISIT (OUTPATIENT)
Dept: FAMILY MEDICINE | Facility: CLINIC | Age: 47
End: 2022-02-20
Payer: COMMERCIAL

## 2022-02-20 DIAGNOSIS — N39.0 ACUTE UTI (URINARY TRACT INFECTION): Primary | ICD-10-CM

## 2022-02-20 PROCEDURE — 99421 OL DIG E/M SVC 5-10 MIN: CPT | Performed by: PHYSICIAN ASSISTANT

## 2022-02-20 RX ORDER — NITROFURANTOIN 25; 75 MG/1; MG/1
100 CAPSULE ORAL 2 TIMES DAILY
Qty: 10 CAPSULE | Refills: 0 | Status: SHIPPED | OUTPATIENT
Start: 2022-02-20 | End: 2022-02-25

## 2022-06-26 ENCOUNTER — HEALTH MAINTENANCE LETTER (OUTPATIENT)
Age: 47
End: 2022-06-26

## 2022-08-21 ENCOUNTER — HEALTH MAINTENANCE LETTER (OUTPATIENT)
Age: 47
End: 2022-08-21

## 2022-09-02 ENCOUNTER — ALLIED HEALTH/NURSE VISIT (OUTPATIENT)
Dept: FAMILY MEDICINE | Facility: CLINIC | Age: 47
End: 2022-09-02
Payer: COMMERCIAL

## 2022-09-02 ENCOUNTER — TELEPHONE (OUTPATIENT)
Dept: FAMILY MEDICINE | Facility: CLINIC | Age: 47
End: 2022-09-02

## 2022-09-02 DIAGNOSIS — Z23 NEED FOR VACCINATION: Primary | ICD-10-CM

## 2022-09-02 PROCEDURE — 90715 TDAP VACCINE 7 YRS/> IM: CPT

## 2022-09-02 PROCEDURE — 90471 IMMUNIZATION ADMIN: CPT

## 2022-09-02 PROCEDURE — 99207 PR NO CHARGE NURSE ONLY: CPT
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
## 2022-09-02 NOTE — PROGRESS NOTES
Pt tolerated TDAP vaccine well and  Was instructed to wait 15 min. p was triaged from RN who instructed pt to have vaccine    After obtaining informed consent, the immunization is given by Keena Berman CMA

## 2022-10-13 ENCOUNTER — NURSE TRIAGE (OUTPATIENT)
Dept: NURSING | Facility: CLINIC | Age: 47
End: 2022-10-13

## 2022-10-13 NOTE — TELEPHONE ENCOUNTER
Pt calls with general questions re covid booster vaccine now available.  All questions answered.  Pt intends to schedule either thru MyChart or vaccine scheduling line provided ([phone]).    We are scheduling all people aged 6 months and older for COVID-19 vaccines.  Persons 6 months - 17 years will receive the Pfizer COVID-19 vaccine and patients 18+ years will be able to choose between Pfizer and Moderna COVID-19 vaccines.     People 12 and older are able to schedule to receive a COVID-19 Bivalent booster vaccine, at least 2 months after last primary dose, or last booster.  Per the CDC age 12 and older can receive Pfizer and 18 and over can receive Moderna, regardless of the original vaccine  received for primary COVID-19 vaccines.    To learn more about COVID-19 vaccines in general, please visit the CDC website: https://www.cdc.gov/coronavirus/2019-ncov/vaccines/index.html     To schedule a COVID-19 vaccine appointment, please log in to [employer] using this link to see when and where we have openings (to schedule a child s vaccine, you will need to log into their [employer] account). [place] [facility] pharmac[facility] also administer Moderna and Pfizer COVID Vaccines to patients 5 years and older. Limited [place] [facility] now offer Novavax primary COVID-19 vaccine.  To schedule at a [place] pharmacy please go to [email or web address].    If you have technical difficulty using [employer], call [phone], option 1 for assistance.    More information about vaccine effectiveness at reducing spread of disease, hospitalizations, and death as well as vaccine safety and answers to other questions can be found on our website: [email or web address].    [facility] Nurse Advisor     Reason for Disposition    COVID-19 vaccine, Frequently Asked Questions (FAQs)    [facility] Specific Disposition - [facility] Specific Patient Instructions  COVID 19  "Nurse Triage Plan/Patient Instructions    Please be aware that novel coronavirus (COVID-19) may be circulating in the community. If you develop symptoms such as fever, cough, or SOB or if you have concerns about the presence of another infection including coronavirus (COVID-19), please contact your health care provider or visit [email or web address]      Additional COVID19 information to add for patients.   How can I protect others?  If you have symptoms (fever, cough, body aches or trouble breathing): Stay home and away from others (self-isolate) until:  At least 10 days have passed since your symptoms started, And   You ve had no fever--and no medicine that reduces fever--for 1 full day (24 hours), And    Your other symptoms have resolved (gotten better).     If you don t have symptoms, but a test showed that you have COVID-19 (you tested positive):  Stay home and away from others (self-isolate). Follow the tips under \"How do I self-isolate?\" below for 10 days (20 days if you have a weak immune system).  You don't need to be retested for COVID-19 before going back to school or work. As long as you're fever-free and feeling better, you can go back to school, work and other activities after waiting the 10 or 20 days.     How do I self-isolate?  Stay in your own room, even for meals. Use your own bathroom if you can.   Stay away from others in your home. No hugging, kissing or shaking hands. No visitors.  Don t go to work, school or anywhere else.   Clean  high touch  surfaces often (doorknobs, counters, handles, etc.). Use a household cleaning spray or wipes. You ll find a full list on the EPA website:  www.epa.gov/pesticide-registration/list-n-disinfectants-use-against-sars-cov-2.  Cover your mouth and nose with a mask, tissue or washcloth to avoid spreading germs.  Wash your hands and face often. Use soap and water.  Caregivers in these groups are at risk for severe illness due to COVID-19:  People 65 years " and older  People who live in a nursing home or long-term care facility  People with chronic disease (lung, heart, cancer, diabetes, kidney, liver, immunologic)  People who have a weakened immune system, including those who:  Are in cancer treatment  Take medicine that weakens the immune system, such as corticosteroids  Had a bone marrow or organ transplant  Have an immune deficiency  Have poorly controlled HIV or AIDS  Are obese (body mass index of 40 or higher)  Smoke regularly  Caregivers should wear gloves while washing dishes, handling laundry and cleaning bedrooms and bathrooms.  Use caution when washing and drying laundry: Don t shake dirty laundry, and use the warmest water setting that you can.  For more tips, go to www.cdc.gov/coronavirus/2019-ncov/downloads/10Things.pdf.    How can I take care of myself?  Get lots of rest. Drink extra fluids (unless a doctor has told you not to).     Take Tylenol (acetaminophen) for fever or pain. If you have liver or kidney problems, ask your family doctor if it s okay to take Tylenol.     Adults can take either:   650 mg (two 325 mg pills) every 4 to 6 hours, or   1,000 mg (two 500 mg pills) every 8 hours as needed.   Note: Don t take more than 3,000 mg in one day.   Acetaminophen is found in many medicines (both prescribed and over-the-counter medicines). Read all labels to be sure you don t take too much.     For children, check the Tylenol bottle for the right dose. The dose is based on the child s age or weight.    If you have other health problems (like cancer, heart failure, an organ transplant or severe kidney disease): Call your specialty clinic if you don t feel better in the next 2 days.    Know when to call 911: Emergency warning signs include:  Trouble breathing or shortness of breath  Pain or pressure in the chest that doesn t go away  Feeling confused like you haven t felt before, or not being able to wake up  Bluish-colored lips or face    What are the  symptoms of COVID-19?   The most common symptoms are cough, fever and trouble breathing.   Less common symptoms include body aches, chills, diarrhea (loose, watery poops), fatigue (feeling very tired), headache, runny nose, sore throat and loss of smell.  COVID-19 can cause severe coughing (bronchitis) and lung infection (pneumonia).    How does it spread?   The virus may spread when a person coughs or sneezes into the air. The virus can travel about 6 feet this way, and it can live on surfaces.    Common  (household disinfectants) will kill the virus.    Who is at risk?  Anyone can catch COVID-19 if they re around someone who has the virus.    How can others protect themselves?   Stay away from people who have COVID-19 (or symptoms of COVID-19).  Wash hands often with soap and water. Or, use hand  with at least 60% alcohol.  Avoid touching the eyes, nose or mouth.   Wear a face mask when you go out in public, when sick or when caring for a sick person.    Where can I get more information?  [facility]: About COVID-19: [email or web address]  CDC: What to Do If You re Sick: www.cdc.gov/coronavirus/2019-ncov/about/steps-when-sick.html  CDC: Ending Home Isolation: www.cdc.gov/coronavirus/2019-ncov/hcp/disposition-in-home-patients.html   CDC: Caring for Someone: www.cdc.gov/coronavirus/2019-ncov/if-you-are-sick/care-for-someone.html   [facility]: Interim Guidance for Hospital Discharge to Home: www.health.[facility].[place].us/diseases/coronavirus/hcp/hospdischarge.pdf  [facility] clinical trials (COVID-19 research studies): clinicalaffairs.[facility]/umn-clinical-trials   Below are the COVID-19 hotlines at the [place] Department of Health ([facility]). Interpreters are available.   For health questions: Call 982-848-6791 or 1-640.211.7223 (7 a.m. to 7 p.m.)  For questions about schools and childcare: Call 118-815-8060 or 1-464.404.8212 (7 a.m. to 7 p.m.)        Thank you for taking steps to  prevent the spread of this virus.  Limit your contact with others.  Wear a simple mask to cover your cough.  Wash your hands well and often.    Resources  [facility]: About COVID-19: [email or web address]  CDC: What to Do If You're Sick: www.cdc.gov/coronavirus/2019-ncov/about/steps-when-sick.html  CDC: Ending Home Isolation: www.cdc.gov/coronavirus/2019-ncov/hcp/disposition-in-home-patients.html   CDC: Caring for Someone: www.cdc.gov/coronavirus/2019-ncov/if-you-are-sick/care-for-someone.html   [facility]: Interim Guidance for Hospital Discharge to Home: www.[facility].mn./diseases/coronavirus/hcp/hospdischarge.pdf  [facility] clinical trials (COVID-19 research studies): clinicalaffairs.[facility]-clinical-trials   Below are the COVID-19 hotlines at the [place] Department of [facility] ([facility]). Interpreters are available.   For health questions: Call 963-204-6813 or 1-302.604.1271 (7 a.m. to 7 p.m.)  For questions about schools and childcare: Call 542-717-8836 or 1-362.877.3367 (7 a.m. to 7 p.m.)    Protocols used: CORONAVIRUS (COVID-19) VACCINE QUESTIONS AND QPWIWWGIP-N-DE 1.18.2022

## 2022-10-14 ENCOUNTER — IMMUNIZATION (OUTPATIENT)
Dept: FAMILY MEDICINE | Facility: CLINIC | Age: 47
End: 2022-10-14
Payer: COMMERCIAL

## 2022-10-14 PROCEDURE — 90686 IIV4 VACC NO PRSV 0.5 ML IM: CPT

## 2022-10-14 PROCEDURE — 91312 COVID-19,PF,PFIZER BOOSTER BIVALENT: CPT

## 2022-10-14 PROCEDURE — 90471 IMMUNIZATION ADMIN: CPT

## 2022-10-14 PROCEDURE — 0124A COVID-19,PF,PFIZER BOOSTER BIVALENT: CPT

## 2022-12-08 ENCOUNTER — HOSPITAL ENCOUNTER (OUTPATIENT)
Dept: MAMMOGRAPHY | Facility: CLINIC | Age: 47
Discharge: HOME | End: 2022-12-08
Attending: FAMILY MEDICINE | Admitting: FAMILY MEDICINE
Payer: COMMERCIAL

## 2022-12-08 DIAGNOSIS — Z12.31 VISIT FOR SCREENING MAMMOGRAM: ICD-10-CM

## 2022-12-08 PROCEDURE — 77067 SCR MAMMO BI INCL CAD: CPT

## 2022-12-22 ENCOUNTER — ANCILLARY PROCEDURE (OUTPATIENT)
Dept: MAMMOGRAPHY | Facility: CLINIC | Age: 47
End: 2022-12-22
Attending: FAMILY MEDICINE
Payer: COMMERCIAL

## 2022-12-22 DIAGNOSIS — R92.8 ABNORMAL MAMMOGRAM: ICD-10-CM

## 2022-12-22 PROCEDURE — 77061 BREAST TOMOSYNTHESIS UNI: CPT | Mod: RT

## 2022-12-22 PROCEDURE — 76642 ULTRASOUND BREAST LIMITED: CPT | Mod: RT

## 2023-03-09 ENCOUNTER — OFFICE VISIT (OUTPATIENT)
Dept: OBGYN | Facility: CLINIC | Age: 48
End: 2023-03-09
Payer: COMMERCIAL

## 2023-03-09 VITALS
BODY MASS INDEX: 28.56 KG/M2 | DIASTOLIC BLOOD PRESSURE: 70 MMHG | RESPIRATION RATE: 16 BRPM | SYSTOLIC BLOOD PRESSURE: 110 MMHG | HEIGHT: 62 IN | HEART RATE: 69 BPM | WEIGHT: 155.2 LBS | TEMPERATURE: 96.9 F

## 2023-03-09 DIAGNOSIS — R63.5 WEIGHT GAIN: Primary | ICD-10-CM

## 2023-03-09 DIAGNOSIS — E04.9 THYROID ENLARGED: ICD-10-CM

## 2023-03-09 DIAGNOSIS — D13.91 FAMILIAL POLYPOSIS: ICD-10-CM

## 2023-03-09 PROCEDURE — 99396 PREV VISIT EST AGE 40-64: CPT | Performed by: OBSTETRICS & GYNECOLOGY

## 2023-03-09 PROCEDURE — 99213 OFFICE O/P EST LOW 20 MIN: CPT | Mod: 25 | Performed by: OBSTETRICS & GYNECOLOGY

## 2023-03-09 NOTE — PROGRESS NOTES
SUBJECTIVE:   CC: Vianey Ryan is an 47 year old woman who presents for preventive health visit.       Patient has been advised of split billing requirements and indicates understanding: Yes  Healthy Habits:    Do you get at least three servings of calcium containing foods daily (dairy, green leafy vegetables, etc.)? yes    Amount of exercise or daily activities, outside of work: 7 day(s) per week    Problems taking medications regularly No    Medication side effects: No    Have you had an eye exam in the past two years? yes    Do you see a dentist twice per year? yes    Do you have sleep apnea, excessive snoring or daytime drowsiness?maybe?      Menopausal changes    Today's PHQ-2 Score:   PHQ-2 ( 1999 Pfizer) 4/6/2021 3/20/2019   Q1: Little interest or pleasure in doing things 0 0   Q2: Feeling down, depressed or hopeless 0 0   PHQ-2 Score 0 0   PHQ-2 Total Score (12-17 Years)- Positive if 3 or more points; Administer PHQ-A if positive 0 0       Abuse: Current or Past(Physical, Sexual or Emotional)- No  Do you feel safe in your environment? Yes        Social History     Tobacco Use     Smoking status: Never     Smokeless tobacco: Never   Substance Use Topics     Alcohol use: No     If you drink alcohol do you typically have >3 drinks per day or >7 drinks per week? No                     Reviewed orders with patient.  Reviewed health maintenance and updated orders accordingly - Yes  BP Readings from Last 3 Encounters:   03/09/23 110/70   07/12/21 113/70   06/05/21 (!) 130/93    Wt Readings from Last 3 Encounters:   03/09/23 70.4 kg (155 lb 3.2 oz)   06/05/21 62.6 kg (138 lb)   04/06/21 63.6 kg (140 lb 3.2 oz)                  Patient Active Problem List   Diagnosis     Basal cell carcinoma of forehead     IBS (irritable bowel syndrome)     CARDIOVASCULAR SCREENING; LDL GOAL LESS THAN 160     Abnormal thyroid function test     health care home     Goiter     Hyperthyroidism     Family history of malignant  "neoplasm of breast     Past Surgical History:   Procedure Laterality Date     C/SECTION, LOW TRANSVERSE  2006    Severe pre-eclampsia      SECTION, TUBAL LIGATION, COMBINED N/A 2015    Procedure: COMBINED  SECTION, TUBAL LIGATION;  Surgeon: Tay Mosqueda MD;  Location: [facility]     COLONOSCOPY  2013    Procedure: COLONOSCOPY;  Colonoscopy;  Surgeon: Manan Rangel MD;  Location: [facility]     CYSTOSCOPY N/A 2016    Procedure: CYSTOSCOPY;  Surgeon: Tay Mosqueda MD;  Location: [facility]     D & C  2010     HC MOHS HEAD/NCK/HND/FT/GEN 1ST STAGE UP T0 5 BLOCKS  2009    Mohs excision right central forehead BCC     HYSTERECTOMY, PAP NO LONGER INDICATED  2016     HYSTEROSCOPY  2010     LAPAROSCOPIC CHOLECYSTECTOMY N/A 2015    Procedure: LAPAROSCOPIC CHOLECYSTECTOMY;  Surgeon: Landon Schultz MD;  Location: [facility]     LAPAROSCOPIC HYSTERECTOMY TOTAL N/A 2016    Procedure: LAPAROSCOPIC HYSTERECTOMY TOTAL;  Surgeon: Tay Mosqueda MD;  Location: [facility]     MOHS MICROGRAPHIC PROCEDURE       TONSILLECTOMY         Social History     Tobacco Use     Smoking status: Never     Smokeless tobacco: Never   Substance Use Topics     Alcohol use: No     Family History   Problem Relation Age of Onset     Hypertension Father      Sleep Apnea Father      Hypertension Paternal Grandmother      Lipids Paternal Grandmother      Thyroid Disease Paternal Grandmother      Blood Disease Mother         Anemia     Diabetes Mother         \"borderline diabetic\"     Breast Cancer Mother 63        triple negative stage 2A     Alcohol/Drug Paternal Grandfather         alcohol     Cancer Maternal Grandfather         skin     Alzheimer Disease Maternal Grandmother          No current outpatient medications on file.     Allergies   Allergen Reactions     Nkda [No Known Drug Allergies]      Recent Labs   Lab Test 21  0820 19  1411 17  1458 " 03/23/17  1012 11/11/15  0847 09/16/15  1105 07/10/15  0920 03/28/15  1735 02/19/15  0941 02/04/15  1516   LDL 54  --   --   --   --   --   --   --   --   --    HDL 64  --   --   --   --   --   --   --   --   --    TRIG 69  --   --   --   --   --   --   --   --   --    ALT  --   --   --   --   --  20  --  10  --  17   CR  --   --   --  0.65  --  0.75  --  0.67   < >  --    GFRESTIMATED  --   --   --  >90  Non  GFR Calc    --  86  --  >90  Non  GFR Calc     < >  --    GFRESTBLACK  --   --   --  >90  African American GFR Calc    --  >90   GFR Calc    --  >90   GFR Calc     < >  --    POTASSIUM  --   --   --  3.4  --  3.7  --   --   --   --    TSH 0.79 0.37*   < >  --    < >  --    < >  --   --   --     < > = values in this interval not displayed.        FHS-7:   Breast CA Risk Assessment (FHS-7) 12/8/2022   Did any of your first-degree relatives have breast or ovarian cancer? Yes   Did any of your relatives have bilateral breast cancer? No   Did any man in your family have breast cancer? No   Did any woman in your family have breast and ovarian cancer? No   Did any woman in your family have breast cancer before age 50 y? No   Do you have 2 or more relatives with breast and/or ovarian cancer? No   Do you have 2 or more relatives with breast and/or bowel cancer? No     click delete button to remove this line now  Mammogram Screening: Recommended annual mammography  Pertinent mammograms are reviewed under the imaging tab.    Pertinent mammograms are reviewed under the imaging tab.  History of abnormal Pap smear: Status post benign hysterectomy. Health Maintenance and Surgical History updated.  PAP / HPV 1/15/2014 2/2/2011 3/6/2009   PAP (Historical) NIL NIL NIL     Reviewed and updated as needed this visit by clinical staff                  Reviewed and updated as needed this visit by Provider                 Past Medical History:   Diagnosis Date      Allergy to cats      Anemia 2015     Basal cell carcinoma      Chickenpox      Depressive disorder, not elsewhere classified     Depression (non-psychotic)     Metrorrhagia 2010     Ovarian cyst 2008     PONV (postoperative nausea and vomiting)      Severe preeclampsia 2006     Thyroid disease      Urinary tract infections       Past Surgical History:   Procedure Laterality Date     C/SECTION, LOW TRANSVERSE  2006    Severe pre-eclampsia      SECTION, TUBAL LIGATION, COMBINED N/A 2015    Procedure: COMBINED  SECTION, TUBAL LIGATION;  Surgeon: Tay Mosqueda MD;  Location: [facility]     COLONOSCOPY  2013    Procedure: COLONOSCOPY;  Colonoscopy;  Surgeon: Manan Rangel MD;  Location: [facility]     CYSTOSCOPY N/A 2016    Procedure: CYSTOSCOPY;  Surgeon: Tay Mosqueda MD;  Location: [facility]     D & C  2010     HC MOHS HEAD/NCK/HND/FT/GEN 1ST STAGE UP T0 5 BLOCKS  2009    Mohs excision right central forehead BCC     HYSTERECTOMY, PAP NO LONGER INDICATED  2016     HYSTEROSCOPY  2010     LAPAROSCOPIC CHOLECYSTECTOMY N/A 2015    Procedure: LAPAROSCOPIC CHOLECYSTECTOMY;  Surgeon: Landon Schultz MD;  Location: [facility]     LAPAROSCOPIC HYSTERECTOMY TOTAL N/A 2016    Procedure: LAPAROSCOPIC HYSTERECTOMY TOTAL;  Surgeon: Tay Mosqueda MD;  Location: [facility]     MOHS MICROGRAPHIC PROCEDURE       TONSILLECTOMY       OB History    Para Term  AB Living   4 3 2 1 1 3   SAB IAB Ectopic Multiple Live Births   1 0 0 0 3      # Outcome Date GA Lbr Fredy/2nd Weight Sex Delivery Anes PTL Lv   4 Term 04/03/15 37w5d  3.43 kg (7 lb 9 oz) M CS-LTranv Spinal N NEERAJ      Name: Fausto      Apgar1: 8  Apgar5: 8   3 Term 11 39w0d  3.487 kg (7 lb 11 oz) F CS-Unspec   NEERAJ      Birth Comments: anemia      Name: Teresa      Apgar1: 9  Apgar5: 9   2  06 35w4d 03:00 3.175 kg (7 lb) F CS   NEERAJ      Birth Comments:  CS - severe pre-eclampsia.       Name: Cindy       Apgar1: 8  Apgar5: 9   1 SAB 09/01/04 6w0d    SAB   DEC       ROS:  CONSTITUTIONAL: NEGATIVE for fever, chills, change in weight  INTEGUMENTARU/SKIN: NEGATIVE for worrisome rashes, moles or lesions  EYES: NEGATIVE for vision changes or irritation  ENT: NEGATIVE for ear, mouth and throat problems  RESP: NEGATIVE for significant cough or SOB  BREAST: NEGATIVE for masses, tenderness or discharge  CV: NEGATIVE for chest pain, palpitations or peripheral edema  GI: NEGATIVE for nausea, abdominal pain, heartburn, or change in bowel habits  : NEGATIVE for unusual urinary or vaginal symptoms. Periods are regular.  MUSCULOSKELETAL: NEGATIVE for significant arthralgias or myalgia  NEURO: NEGATIVE for weakness, dizziness or paresthesias  ENDOCRINE: NEGATIVE for temperature intolerance, skin/hair changes  HEME/ALLERGY/IMMUNE: NEGATIVE for bleeding problems  PSYCHIATRIC: NEGATIVE for changes in mood or affect    OBJECTIVE:   LMP 08/12/2016   EXAM:  GENERAL: healthy, alert and no distress  EYES: Eyes grossly normal to inspection, PERRL and conjunctivae and sclerae normal  NECK: no adenopathy, no asymmetry, masses, or scars and thyroid normal to palpation  RESP: lungs clear to auscultation - no rales, rhonchi or wheezes  CV: regular rate and rhythm, normal S1 S2, no S3 or S4, no murmur, click or rub, no peripheral edema and peripheral pulses strong  ABDOMEN: soft, nontender, no hepatosplenomegaly, no masses and bowel sounds normal   (female): normal female external genitalia, normal urethral meatus, vaginal mucosa pink, moist, well rugated, no discharge  and Cervix and uterus absent   Adnexa without masses   MS: no gross musculoskeletal defects noted, no edema  SKIN: no suspicious lesions or rashes  NEURO: Normal strength and tone, mentation intact and speech normal  PSYCH: mentation appears normal, affect normal/bright    Diagnostic Test Results:  Labs reviewed in  "Epic    ASSESSMENT/PLAN:   (R63.5) Weight gain  (primary encounter diagnosis)  Comment: despite exercizing  Plan: TSH with free T4 reflex, Glucose, US Thyroid,         Follicle stimulating hormone            (D12.6) Familial polyposis  Comment:   Plan: Colonoscopy Screening  Referral, US         Thyroid, Follicle stimulating hormone            (E04.9) Thyroid enlarged  Comment: right lobe  History of nodules-small    Plan: US Thyroid, Follicle stimulating hormone              Patient has been advised of split billing requirements and indicates understanding: Yes  COUNSELING:   Reviewed preventive health counseling, as reflected in patient instructions       Weight       Regular exercise       Healthy diet/nutrition    Estimated body mass index is 24.45 kg/m  as calculated from the following:    Height as of 6/5/21: 1.6 m (5' 3\").    Weight as of 6/5/21: 62.6 kg (138 lb).        She reports that she has never smoked. She has never used smokeless tobacco.      Counseling Resources:  ATP IV Guidelines  Pooled Cohorts Equation Calculator  Breast Cancer Risk Calculator  BRCA-Related Cancer Risk Assessment: FHS-7 Tool  FRAX Risk Assessment  ICSI Preventive Guidelines  Dietary Guidelines for Americans, 2010  USDA's MyPlate  ASA Prophylaxis  Lung CA Screening    Tay Mosqueda MD  [facility] [place]  "

## 2023-03-14 ENCOUNTER — HOSPITAL ENCOUNTER (OUTPATIENT)
Dept: ULTRASOUND IMAGING | Facility: CLINIC | Age: 48
Discharge: HOME | End: 2023-03-14
Attending: OBSTETRICS & GYNECOLOGY | Admitting: OBSTETRICS & GYNECOLOGY
Payer: COMMERCIAL

## 2023-03-14 DIAGNOSIS — D13.91 FAMILIAL POLYPOSIS: ICD-10-CM

## 2023-03-14 DIAGNOSIS — R63.5 WEIGHT GAIN: ICD-10-CM

## 2023-03-14 DIAGNOSIS — E04.9 THYROID ENLARGED: ICD-10-CM

## 2023-03-14 PROCEDURE — 76536 US EXAM OF HEAD AND NECK: CPT

## 2023-03-14 NOTE — RESULT ENCOUNTER NOTE
Your results are back and the nodules are multiple but small.  They recommend no sampling at this time.  Tay Mosqueda MD

## 2023-03-16 ENCOUNTER — LAB (OUTPATIENT)
Dept: LAB | Facility: CLINIC | Age: 48
End: 2023-03-16
Payer: COMMERCIAL

## 2023-03-16 DIAGNOSIS — E04.9 THYROID ENLARGED: ICD-10-CM

## 2023-03-16 DIAGNOSIS — D13.91 FAMILIAL POLYPOSIS: ICD-10-CM

## 2023-03-16 DIAGNOSIS — R63.5 WEIGHT GAIN: ICD-10-CM

## 2023-03-16 LAB
FSH SERPL IRP2-ACNC: 147 MIU/ML
TSH SERPL DL<=0.005 MIU/L-ACNC: 0.97 UIU/ML (ref 0.3–4.2)

## 2023-03-16 PROCEDURE — 83001 ASSAY OF GONADOTROPIN (FSH): CPT

## 2023-03-16 PROCEDURE — 36415 COLL VENOUS BLD VENIPUNCTURE: CPT

## 2023-03-16 PROCEDURE — 84443 ASSAY THYROID STIM HORMONE: CPT

## 2023-03-16 NOTE — RESULT ENCOUNTER NOTE
Anne  Your Thyroid is normal  Your FSH indicates that you are well into menopause.  Tay Mosqueda MD

## 2023-03-28 DIAGNOSIS — N95.2 ATROPHIC VAGINITIS: Primary | ICD-10-CM

## 2023-03-28 RX ORDER — ESTRADIOL 10 UG/1
10 INSERT VAGINAL
Qty: 24 TABLET | Refills: 3 | Status: SHIPPED | OUTPATIENT
Start: 2023-03-30 | End: 2023-04-03

## 2023-04-03 RX ORDER — ESTRADIOL 10 UG/1
10 INSERT VAGINAL
Qty: 24 TABLET | Refills: 3 | Status: SHIPPED | OUTPATIENT
Start: 2023-04-03 | End: 2023-09-07

## 2023-04-12 ENCOUNTER — DOCUMENTATION ONLY (OUTPATIENT)
Dept: LAB | Facility: CLINIC | Age: 48
End: 2023-04-12
Payer: COMMERCIAL

## 2023-04-12 NOTE — PROGRESS NOTES
Patient has lab appointment on 04/13/2023 at 0830. Please place orders for appointment.  Thank you lab

## 2023-04-13 DIAGNOSIS — R63.5 WEIGHT GAIN: Primary | ICD-10-CM

## 2023-04-23 NOTE — PATIENT INSTRUCTIONS
Dear Vianey Ryan    After reviewing your responses, I've been able to diagnose you with a urinary tract infection, which is a common infection of the bladder with bacteria.  This is not a sexually transmitted infection, though urinating immediately after intercourse can help prevent infections.  Drinking lots of fluids is also helpful to clear your current infection and prevent the next one.      I have sent a prescription for antibiotics to your pharmacy to treat this infection.    It is important that you take all of your prescribed medication even if your symptoms are improving after a few doses.  Taking all of your medicine helps prevent the symptoms from returning.     If your symptoms worsen, you develop pain in your back or stomach, develop fevers, or are not improving in 5 days, please contact your primary care provider for an appointment or visit any of our convenient Walk-in or Urgent Care Centers to be seen, which can be found on our website here.    Thanks again for choosing us as your health care partner,    Jose Alfredo Ramsey PA-C    Urinary Tract Infections in Women  Urinary tract infections (UTIs) are most often caused by bacteria. These bacteria enter the urinary tract. The bacteria may come from inside the body. Or they may travel from the skin outside the rectum or vagina into the urethra. Female anatomy makes it easy for bacteria from the bowel to enter a woman s urinary tract, which is the most common source of UTI. This means women develop UTIs more often than men. Pain in or around the urinary tract is a common UTI symptom. But the only way to know for sure if you have a UTI for the healthcare provider to test your urine. The two tests that may be done are the urinalysis and urine culture.     Types of UTIs    Cystitis. A bladder infection (cystitis) is the most common UTI in women. You may have urgent or frequent need to pee. You may also have pain, burning when you pee, and bloody  urine.    Urethritis. This is an inflamed urethra, which is the tube that carries urine from the bladder to outside the body. You may have lower stomach or back pain. You may also have urgent or frequent need to pee.    Pyelonephritis. This is a kidney infection. If not treated, it can be serious and damage your kidneys. In severe cases, you may need to stay in the hospital. You may have a fever and lower back pain.    Medicines to treat a UTI  Most UTIs are treated with antibiotics. These kill the bacteria. The length of time you need to take them depends on the type of infection. It may be as short as 3 days. If you have repeated UTIs, you may need a low-dose antibiotic for several months. Take antibiotics exactly as directed. Don t stop taking them until all of the medicine is gone. If you stop taking the antibiotic too soon, the infection may not go away. You may also develop a resistance to the antibiotic. This can make it much harder to treat.   Lifestyle changes to treat and prevent UTIs   The lifestyle changes below will help get rid of your UTI. They may also help prevent future UTIs.     Drink plenty of fluids. This includes water, juice, or other caffeine-free drinks. Fluids help flush bacteria out of your body.    Empty your bladder. Always empty your bladder when you feel the urge to pee. And always pee before going to sleep. Urine that stays in your bladder can lead to infection. Try to pee before and after sex as well.    Practice good personal hygiene. Wipe yourself from front to back after using the toilet. This helps keep bacteria from getting into the urethra.    Use condoms during sex. These help prevent UTIs caused by sexually transmitted bacteria. Also don't use spermicides during sex. These can increase the risk for UTIs. Choose other forms of birth control instead. For women who tend to get UTIs after sex, a low-dose of a preventive antibiotic may be used. Be sure to discuss this option with  your healthcare provider.    Follow up with your healthcare provider as directed. He or she may test to make sure the infection has cleared. If needed, more treatment may be started.  [employer] last reviewed this educational content on 7/1/2019 2000-2021 [employer]. All rights reserved. This information is not intended as a substitute for professional medical care. Always follow your healthcare professional's instructions.         no

## 2023-04-24 ENCOUNTER — OFFICE VISIT (OUTPATIENT)
Dept: FAMILY MEDICINE | Facility: CLINIC | Age: 48
End: 2023-04-24
Payer: COMMERCIAL

## 2023-04-24 VITALS
WEIGHT: 153.6 LBS | OXYGEN SATURATION: 99 % | DIASTOLIC BLOOD PRESSURE: 84 MMHG | HEIGHT: 62 IN | HEART RATE: 57 BPM | TEMPERATURE: 96.6 F | SYSTOLIC BLOOD PRESSURE: 118 MMHG | RESPIRATION RATE: 16 BRPM | BODY MASS INDEX: 28.26 KG/M2

## 2023-04-24 DIAGNOSIS — H61.22 IMPACTED CERUMEN OF LEFT EAR: ICD-10-CM

## 2023-04-24 DIAGNOSIS — H93.12 TINNITUS, LEFT: ICD-10-CM

## 2023-04-24 DIAGNOSIS — H93.8X2 EAR FULLNESS, LEFT: Primary | ICD-10-CM

## 2023-04-24 PROCEDURE — 99213 OFFICE O/P EST LOW 20 MIN: CPT | Mod: 25 | Performed by: NURSE PRACTITIONER

## 2023-04-24 PROCEDURE — 69210 REMOVE IMPACTED EAR WAX UNI: CPT | Mod: LT | Performed by: NURSE PRACTITIONER

## 2023-04-24 ASSESSMENT — PAIN SCALES - GENERAL: PAINLEVEL: NO PAIN (0)

## 2023-04-24 NOTE — PROGRESS NOTES
Assessment & Plan       ICD-10-CM    1. Ear fullness, left  H93.8X2       2. Tinnitus, left  H93.12       3. Impacted cerumen of left ear  H61.22 REMOVE IMPACTED CERUMEN        Ear wash completed by medical assistant utilizing water lavage to remove cerumen build-up, repeat otoscopic exam noting mildly erythematous TM with clear effusion. Discussed that symptoms are likely from a combination of cerumen build-up as well as possible eustachian tube dysfunction. Reviewed recommendations to start fluticasone nasal spray, can also trial OTC antihistamine such as loratadine. If no improvement, recommend follow-up in ~4 weeks.    YAO Bauer CNP  [facility]   Anne is a 47 year old, presenting for the following health issues:  Ear Problem and Health Maintenance (Advised patient of hm. )        4/24/2023     8:19 AM   Additional Questions   Roomed by Irlanda Baca CMA   Accompanied by self     Patinet states that she has a 'heavy' feeling in her left ear along with a high pitched sound in her. Has has this for 2 months, daily. She has had this before, shortly after a cold. She wasn't sick at all this time.    History of Present Illness       Reason for visit:  Tinnitus/water in ear  Symptom onset:  More than a month  Symptoms include:  Intense ringing and sometimes heaviness in left ear  Symptom intensity:  Moderate  Symptom progression:  Staying the same  Had these symptoms before:  Yes  Has tried/received treatment for these symptoms:  No  What makes it worse:  No  What makes it better:  No    She eats 4 or more servings of fruits and vegetables daily.She consumes 2 sweetened beverage(s) daily.She exercises with enough effort to increase her heart rate 30 to 60 minutes per day.  She exercises with enough effort to increase her heart rate 6 days per week.   She is taking medications regularly.     Anne presents to the clinic today for evaluation of left ear  "discomfort and tinnitus. She notes that she has felt intermittent left ear \"heaviness\" over the past two months, symptoms seem to wax and wane without known trigger. She also has had intermittent tinnitus during this time. No known injury, no recent illness. She denies facial pressure, nasal congestion, or cough. She notes that she had previous symptoms in 2017 which resolved spontaneously after evaluation by ENT. She does not use any OTC allergy medications, denies seasonal allergies.    Review of Systems   Constitutional, HEENT, cardiovascular, pulmonary, gi and gu systems are negative, except as otherwise noted.      Objective    /84 (BP Location: Right arm, Patient Position: Sitting, Cuff Size: Adult Regular)   Pulse 57   Temp (!) 96.6  F (35.9  C) (Tympanic)   Resp 16   Ht 1.575 m (5' 2\")   Wt 69.7 kg (153 lb 9.6 oz)   LMP 08/12/2016   SpO2 99%   BMI 28.09 kg/m    Body mass index is 28.09 kg/m .  Physical Exam   GENERAL: healthy, alert and no distress  EYES: Eyes grossly normal to inspection, PERRL and conjunctivae and sclerae normal  HENT: Right ear canals and TM's normal. Left TM with cerumen obstructing view of TM. Removed cerumen with water lavage. Following removal tympanic membrane  slightly erythematous with clear effusion. Otherwise clear and normal canal.  Psych: Speech is fluent and thought process is linear, affect is reactive and appropriate         "

## 2023-08-30 NOTE — PROGRESS NOTES
History of Present Illness - Vianey Ryan is a 41 year old female who presents with a history of left sided hearing loss that came on in the setting of a cough and URI symptoms. She denies any vertigo, and she has no prior history of transient hearing loss. She was treated with 3 days of steroids but did not notice any improvement in hearing. She continues to feel that the left ear remains full.    Past Medical History -   Patient Active Problem List   Diagnosis     Basal cell carcinoma of forehead     IBS (irritable bowel syndrome)     CARDIOVASCULAR SCREENING; LDL GOAL LESS THAN 160     Abnormal thyroid function test     health care home     Goiter     Hyperthyroidism       Current Medications -   Current Outpatient Prescriptions:      albuterol (PROAIR HFA/PROVENTIL HFA/VENTOLIN HFA) 108 (90 BASE) MCG/ACT Inhaler, Inhale 2 puffs into the lungs every 6 hours as needed for shortness of breath / dyspnea or wheezing (Patient not taking: Reported on 4/11/2017), Disp: 1 Inhaler, Rfl: 0    Allergies -   Allergies   Allergen Reactions     Nkda [No Known Drug Allergies]        Social History -   Social History     Social History     Marital status:      Spouse name: Andrea     Number of children: 3     Years of education: N/A     Occupational History     , 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 No     Social History Narrative       Family History -   Family History   Problem Relation Age of Onset     Hypertension Father      Hypertension Paternal Grandmother      Lipids Paternal Grandmother      Thyroid Disease Paternal Grandmother      Blood Disease  "Mother      Anemia     DIABETES Mother      \"borderline diabetic\"     Alcohol/Drug Paternal Grandfather      alcohol     CANCER Maternal Grandfather      skin     Alzheimer Disease Maternal Grandmother      Breast Cancer No family hx of        Review of Systems - As per HPI and PMHx, otherwise 7 system review of the head and neck negative. 10+ system review negative.    Physical Exam  /71 (BP Location: Right arm, Patient Position: Chair, Cuff Size: Adult Regular)  Pulse 79  Temp 98.5  F (36.9  C) (Oral)  Ht 1.594 m (5' 2.75\")  Wt 56.2 kg (124 lb)  LMP 08/12/2016  BMI 22.14 kg/m2  General - The patient is well nourished and well developed, and appears to have good nutritional status.  Alert and oriented to person and place, answers questions and cooperates with examination appropriately.   Head and Face - Normocephalic and atraumatic, with no gross asymmetry noted of the contour of the facial features.  The facial nerve is intact, with strong symmetric movements.  Voice and Breathing - The patient was breathing comfortably without the use of accessory muscles. There was no wheezing, stridor, or stertor.  The patients voice was clear and strong, and had appropriate pitch and quality.  Ears - Bilateral pinna and EACs with normal appearing overlying skin. Tympanic membrane intact with good mobility on pneumatic otoscopy bilaterally. Bony landmarks of the ossicular chain are normal. The tympanic membranes are normal in appearance. No retraction, perforation, or masses.  No fluid or purulence was seen in the external canal or the middle ear.   Eyes - Extraocular movements intact.  Sclera were not icteric or injected, conjunctiva were pink and moist.  Mouth - Examination of the oral cavity showed pink, healthy oral mucosa. No lesions or ulcerations noted.  The tongue was mobile and midline, and the dentition were in good condition.    Throat - The walls of the oropharynx were smooth, pink, moist, symmetric, and " 48F R renal mass s/p R partial nephrectomy 8/30.    Plan:  -monitor I's and O's  -SCD's  -OOB  -IS  -pain and nausea control  -CLD  -ancef  -am labs had no lesions or ulcerations.  The tonsillar pillars and soft palate were symmetric.  The uvula was midline on elevation.  Neck - Normal midline excursion of the laryngotracheal complex during swallowing.  Full range of motion on passive movement.  Palpation of the occipital, submental, submandibular, internal jugular chain, and supraclavicular nodes did not demonstrate any abnormal lymph nodes or masses.  The carotid pulse was palpable bilaterally.  Palpation of the thyroid was soft and smooth, with no nodules or goiter appreciated.  The trachea was mobile and midline.  Nose - External contour is symmetric, no gross deflection or scars.  Nasal mucosa is pink and moist with no abnormal mucus.  The septum was midline and non-obstructive, turbinates of normal size and position.  No polyps, masses, or purulence noted on examination.  Heart:  Regular rate and rhythm, no murmurs.  Lungs:  Chest clear to auscultation bilaterally    Audiogram 04/11/17  Normal hearing on the right. Left with mild high frequency SNHL. Discriminiation excellent bilaterally.        Assessment - Vianey Ryan is a 41 year old female with left sudden SNHL. I placed her on prednisone for 2 weeks, and we discussed dosing considerations for breastfeeding. I also placed an order for an MRI Brain to evaluate for a CPA tumor. I will call with the results. She will return for an audiogram in 6 months.       Dr. Geovany Shay MD  Otolaryngology  [facility]

## 2023-09-07 ENCOUNTER — OFFICE VISIT (OUTPATIENT)
Dept: OBGYN | Facility: CLINIC | Age: 48
End: 2023-09-07
Payer: COMMERCIAL

## 2023-09-07 VITALS
BODY MASS INDEX: 26.75 KG/M2 | HEART RATE: 68 BPM | SYSTOLIC BLOOD PRESSURE: 132 MMHG | DIASTOLIC BLOOD PRESSURE: 73 MMHG | HEIGHT: 63 IN | TEMPERATURE: 98.7 F | RESPIRATION RATE: 18 BRPM | WEIGHT: 151 LBS

## 2023-09-07 DIAGNOSIS — N95.2 ATROPHIC VAGINITIS: ICD-10-CM

## 2023-09-07 PROCEDURE — 99213 OFFICE O/P EST LOW 20 MIN: CPT | Performed by: OBSTETRICS & GYNECOLOGY

## 2023-09-07 RX ORDER — ESTRADIOL 10 UG/1
10 INSERT VAGINAL DAILY
Qty: 30 TABLET | Refills: 0 | Status: SHIPPED | OUTPATIENT
Start: 2023-09-07 | End: 2023-10-07

## 2023-09-07 NOTE — PROGRESS NOTES
CC:  Follow up  from previous visit for treatment for atrophic vaginitis  HPI:  Vianey Ryan is a 48 year old female is a   .  Patient's last menstrual period was 2016. She is s/p hysterectomy.  She has been using Vagifem twice weekly and has had 40% improvement in her vaginal dryness.  She still seems more prone to UTI after intercourse    Patients records are available and reviewed at today's visit.    Past GYN history:  No STD history       Last PAP smear:  Normal  Last TSH:   TSH   Date Value Ref Range Status   2023 0.97 0.30 - 4.20 uIU/mL Final   2021 0.79 0.40 - 4.00 mU/L Final      , normal?  Yes    Past Medical History:   Diagnosis Date    Allergy to cats     Anemia 2015    Basal cell carcinoma     Chickenpox     Depressive disorder, not elsewhere classified     Depression (non-psychotic)    Metrorrhagia 2010    Ovarian cyst 2008    PONV (postoperative nausea and vomiting)     Severe preeclampsia 2006    Thyroid disease     Urinary tract infections        Past Surgical History:   Procedure Laterality Date    C/SECTION, LOW TRANSVERSE  2006    Severe pre-eclampsia     SECTION, TUBAL LIGATION, COMBINED N/A 2015    Procedure: COMBINED  SECTION, TUBAL LIGATION;  Surgeon: Tay Mosqueda MD;  Location: [facility]    COLONOSCOPY  2013    Procedure: COLONOSCOPY;  Colonoscopy;  Surgeon: Manan Rangel MD;  Location: [facility]    CYSTOSCOPY N/A 2016    Procedure: CYSTOSCOPY;  Surgeon: Tay Mosqueda MD;  Location: [facility]    D & C  2010    HC MOHS HEAD/NCK/HND/FT/GEN 1ST STAGE UP T0 5 BLOCKS  2009    Mohs excision right central forehead BCC    HYSTERECTOMY, PAP NO LONGER INDICATED  2016    HYSTEROSCOPY  2010    LAPAROSCOPIC CHOLECYSTECTOMY N/A 2015    Procedure: LAPAROSCOPIC CHOLECYSTECTOMY;  Surgeon: Landon Schultz MD;  Location: [facility]    LAPAROSCOPIC HYSTERECTOMY TOTAL N/A 2016     "Procedure: LAPAROSCOPIC HYSTERECTOMY TOTAL;  Surgeon: Tay Mosqueda MD;  Location: [facility]    MOHS MICROGRAPHIC PROCEDURE      TONSILLECTOMY         Family History   Problem Relation Age of Onset    Hypertension Father     Sleep Apnea Father     Hypertension Paternal Grandmother     Lipids Paternal Grandmother     Thyroid Disease Paternal Grandmother     Blood Disease Mother         Anemia    Diabetes Mother         \"borderline diabetic\"    Breast Cancer Mother 63        triple negative stage 2A    Alcohol/Drug Paternal Grandfather         alcohol    Cancer Maternal Grandfather         skin    Alzheimer Disease Maternal Grandmother        Allergies: Nkda [no known drug allergy]    Current Outpatient Medications   Medication Sig Dispense Refill    estradiol (VAGIFEM) 10 MCG TABS vaginal tablet Place 1 tablet (10 mcg) vaginally daily for 30 days 30 tablet 0       ROS:  C: NEGATIVE for fever, chills, change in weight  I: NEGATIVE for worrisome rashes, moles or lesions  E: NEGATIVE for vision changes or irritation  E/M: NEGATIVE for ear, mouth and throat problems  R: NEGATIVE for significant cough or SOB  CV: NEGATIVE for chest pain, palpitations or peripheral edema  GI: NEGATIVE for nausea, abdominal pain, heartburn, or change in bowel habits  : NEGATIVE for frequency, dysuria, hematuria, vaginal discharge  M: NEGATIVE for significant arthralgias or myalgia  N: NEGATIVE for weakness, dizziness or paresthesias  E: NEGATIVE for temperature intolerance, skin/hair changes  P: NEGATIVE for changes in mood or affect    EXAM:  Blood pressure 132/73, pulse 68, temperature 98.7  F (37.1  C), temperature source Tympanic, resp. rate 18, height 1.6 m (5' 3\"), weight 68.5 kg (151 lb), last menstrual period 08/12/2016, not currently breastfeeding.   BMI= Body mass index is 26.75 kg/m .  General - pleasant female in no acute distress.  No exam today      ASSESSMENT/PLAN:  (N95.2) Atrophic vaginitis  Comment: some " Improvement  Plan: estradiol (VAGIFEM) 10 MCG TABS vaginal tablet        Increase to daily- try for a week , then reassess  May use for up to a month , then if she achieves her goal, would decrease to twice weekly for maintenance           Letter will be sent to the referring provider.    Tay Mosqueda MD  20 of 20 minutes spent Face to face counseling relative to the issues noted above.

## 2023-09-07 NOTE — NURSING NOTE
"Initial /73 (BP Location: Right arm, Patient Position: Chair, Cuff Size: Adult Regular)   Pulse 68   Temp 98.7  F (37.1  C) (Tympanic)   Resp 18   Ht 1.6 m (5' 3\")   Wt 68.5 kg (151 lb)   LMP 08/12/2016   BMI 26.75 kg/m   Estimated body mass index is 26.75 kg/m  as calculated from the following:    Height as of this encounter: 1.6 m (5' 3\").    Weight as of this encounter: 68.5 kg (151 lb). .    "

## 2023-09-07 NOTE — PROGRESS NOTES
A user error has taken place: encounter opened in error, closed for administrative reasons.  Tay Mosqueda MD

## 2023-10-05 ENCOUNTER — TRANSFERRED RECORDS (OUTPATIENT)
Dept: HEALTH INFORMATION MANAGEMENT | Facility: CLINIC | Age: 48
End: 2023-10-05
Payer: COMMERCIAL

## 2023-10-09 ENCOUNTER — IMMUNIZATION (OUTPATIENT)
Dept: FAMILY MEDICINE | Facility: CLINIC | Age: 48
End: 2023-10-09
Payer: COMMERCIAL

## 2023-10-09 PROCEDURE — 90686 IIV4 VACC NO PRSV 0.5 ML IM: CPT

## 2023-10-09 PROCEDURE — 91320 SARSCV2 VAC 30MCG TRS-SUC IM: CPT

## 2023-10-09 PROCEDURE — 90471 IMMUNIZATION ADMIN: CPT

## 2023-10-09 PROCEDURE — 90480 ADMN SARSCOV2 VAC 1/ONLY CMP: CPT

## 2023-10-17 ENCOUNTER — PATIENT OUTREACH (OUTPATIENT)
Dept: GASTROENTEROLOGY | Facility: CLINIC | Age: 48
End: 2023-10-17
Payer: COMMERCIAL

## 2023-12-11 ENCOUNTER — OFFICE VISIT (OUTPATIENT)
Dept: DERMATOLOGY | Facility: CLINIC | Age: 48
End: 2023-12-11
Payer: COMMERCIAL

## 2023-12-11 DIAGNOSIS — D22.9 NEVUS: Primary | ICD-10-CM

## 2023-12-11 DIAGNOSIS — L82.0 INFLAMED SEBORRHEIC KERATOSIS: ICD-10-CM

## 2023-12-11 DIAGNOSIS — L82.1 SEBORRHEIC KERATOSIS: ICD-10-CM

## 2023-12-11 DIAGNOSIS — L81.4 LENTIGO: ICD-10-CM

## 2023-12-11 DIAGNOSIS — Z85.828 HISTORY OF BASAL CELL CARCINOMA (BCC): ICD-10-CM

## 2023-12-11 DIAGNOSIS — Z86.018 HISTORY OF DYSPLASTIC NEVUS: ICD-10-CM

## 2023-12-11 DIAGNOSIS — D18.01 ANGIOMA OF SKIN: ICD-10-CM

## 2023-12-11 PROCEDURE — 99203 OFFICE O/P NEW LOW 30 MIN: CPT | Mod: 25 | Performed by: PHYSICIAN ASSISTANT

## 2023-12-11 PROCEDURE — 17110 DESTRUCTION B9 LES UP TO 14: CPT | Performed by: PHYSICIAN ASSISTANT

## 2023-12-11 ASSESSMENT — PAIN SCALES - GENERAL: PAINLEVEL: NO PAIN (0)

## 2023-12-11 NOTE — PROGRESS NOTES
HPI:   Chief complaints: Vianey Ryan is a pleasant 48 year old female who presents for Full skin cancer screening to rule out skin cancer   Last Skin Exam: n/a      1st Baseline: yes  Personal HX of Skin Cancer: Yes BCC x 2 in the past  Personal HX of Malignant Melanoma: no   Family HX of Skin Cancer / Malignant Melanoma: no  Personal HX of Atypical Moles:   yes severely dysplastic nevus in the past  Risk factors: history of sun exposure and burns  New / Changing lesions: yes scaly spot on the left cheek and on the right hairline  Social History: Has 3 children oldest is about to turn 18.   On review of systems, there are no further skin complaints, patient is feeling otherwise well.   ROS of the following were done and are negative: Constitutional, Eyes, Ears, Nose,   Mouth, Throat, Cardiovascular, Respiratory, GI, Genitourinary, Musculoskeletal,   Psychiatric, Endocrine, Allergic/Immunologic.    PHYSICAL EXAM:   [facility] 08/12/2016   Skin exam performed as follows: Type 2 skin. Mood appropriate  Alert and Oriented X 3. Well developed, well nourished in no distress.  General appearance: Normal  Head including face: Normal  Eyes: conjunctiva and lids: Normal  Mouth: Lips, teeth, gums: Normal  Neck: Normal  Chest-breast/axillae: Normal  Back: Normal  Extremities: digits/nails (clubbing): Normal  Eccrine and Apocrine glands: Normal  Right upper extremity: Normal  Left upper extremity: Normal  Right lower extremity: Normal  Left lower extremity: Normal  Skin: Scalp and body hair: See below    Pt deferred exam of breasts, groin, buttocks: No    Other physical findings:  1. Multiple pigmented macules on extremities and trunk  2. Multiple pigmented macules on face, trunk and extremities  3. Multiple vascular papules on trunk, arms and legs  4. Multiple scattered keratotic plaques  5. Inflamed keratotic papule on the left medial cheek x 1       Except as noted above, no other signs of skin cancer or melanoma.      ASSESSMENT/PLAN:   Benign Full skin cancer screening today. . Patient with history of BCC, severely dysplastic nevus  Advised on monthly self exams and 1 year  Patient Education: Appropriate brochures given.    Multiple benign appearing melanocytic nevi on arms, legs and trunk. Discussed ABCDEs of melanoma and sunscreen.   Multiple lentigos on arms, legs and trunk. Advised benign, no treatment needed.  Multiple scattered angiomas. Advised benign, no treatment needed.   Seborrheic keratosis on arms, legs and trunk. Advised benign, no treatment needed.  Inflamed seborrheic keratosis on the left medial cheek x 1. As physically tender cryosurgery performed. Advised on post op care.             Follow-up: yearly/PRN sooner    1.) Patient was asked about new and changing moles. YES  2.) Patient received a complete physical skin examination: YES  3.) Patient was counseled to perform a monthly self skin examination: YES  Scribed By: Renetta Reynolds, MS, PA-C

## 2023-12-11 NOTE — LETTER
12/11/2023         RE: Vianey Ryan  [address] [facility] [address]        Dear Colleague,    Thank you for referring your patient, Vianey Ryan, to the [facility]. Please see a copy of my visit note below.    HPI:   Chief complaints: Vianey Ryan is a pleasant 48 year old female who presents for Full skin cancer screening to rule out skin cancer   Last Skin Exam: n/a      1st Baseline: yes  Personal HX of Skin Cancer: Yes BCC x 2 in the past  Personal HX of Malignant Melanoma: no   Family HX of Skin Cancer / Malignant Melanoma: no  Personal HX of Atypical Moles:   yes severely dysplastic nevus in the past  Risk factors: history of sun exposure and burns  New / Changing lesions: yes scaly spot on the left cheek and on the right hairline  Social History: Has 3 children oldest is about to turn 18.   On review of systems, there are no further skin complaints, patient is feeling otherwise well.   ROS of the following were done and are negative: Constitutional, Eyes, Ears, Nose,   Mouth, Throat, Cardiovascular, Respiratory, GI, Genitourinary, Musculoskeletal,   Psychiatric, Endocrine, Allergic/Immunologic.    PHYSICAL EXAM:   LMP 08/12/2016   Skin exam performed as follows: Type 2 skin. Mood appropriate  Alert and Oriented X 3. Well developed, well nourished in no distress.  General appearance: Normal  Head including face: Normal  Eyes: conjunctiva and lids: Normal  Mouth: Lips, teeth, gums: Normal  Neck: Normal  Chest-breast/axillae: Normal  Back: Normal  Extremities: digits/nails (clubbing): Normal  Eccrine and Apocrine glands: Normal  Right upper extremity: Normal  Left upper extremity: Normal  Right lower extremity: Normal  Left lower extremity: Normal  Skin: Scalp and body hair: See below    Pt deferred exam of breasts, groin, buttocks: No    Other physical findings:  1. Multiple pigmented macules on extremities and trunk  2. Multiple pigmented macules on face, trunk  and extremities  3. Multiple vascular papules on trunk, arms and legs  4. Multiple scattered keratotic plaques  5. Inflamed keratotic papule on the left medial cheek x 1       Except as noted above, no other signs of skin cancer or melanoma.     ASSESSMENT/PLAN:   Benign Full skin cancer screening today. . Patient with history of BCC, severely dysplastic nevus  Advised on monthly self exams and 1 year  Patient Education: Appropriate brochures given.    Multiple benign appearing melanocytic nevi on arms, legs and trunk. Discussed ABCDEs of melanoma and sunscreen.   Multiple lentigos on arms, legs and trunk. Advised benign, no treatment needed.  Multiple scattered angiomas. Advised benign, no treatment needed.   Seborrheic keratosis on arms, legs and trunk. Advised benign, no treatment needed.  Inflamed seborrheic keratosis on the left medial cheek x 1. As physically tender cryosurgery performed. Advised on post op care.             Follow-up: yearly/PRN sooner    1.) Patient was asked about new and changing moles. YES  2.) Patient received a complete physical skin examination: YES  3.) Patient was counseled to perform a monthly self skin examination: YES  Scribed By: Renetta Reynolds, MS, PASaraC      Again, thank you for allowing me to participate in the care of your patient.        Sincerely,        Renetta Reynolds PA-C

## 2024-01-09 ENCOUNTER — MYC MEDICAL ADVICE (OUTPATIENT)
Dept: OBGYN | Facility: CLINIC | Age: 49
End: 2024-01-09
Payer: COMMERCIAL

## 2024-01-09 DIAGNOSIS — N95.2 ATROPHIC VAGINITIS: Primary | ICD-10-CM

## 2024-01-16 RX ORDER — ESTRADIOL 10 UG/1
10 INSERT VAGINAL
Qty: 24 TABLET | Refills: 3 | Status: SHIPPED | OUTPATIENT
Start: 2024-01-18 | End: 2024-04-03

## 2024-01-16 NOTE — TELEPHONE ENCOUNTER
S-(situation): Pt needing vagifem refill    B-(background): Last appt: 9/7/23 with Dr. Mosqueda for atrophic vaginitis  Vagifem was prescribed to be taken once a day for one week and then twice weekly for maintenance  Next appt: 3/24/24 with Dr. Salomon     A-(assessment): Refill request received from [facility]   Medication is no longer active in pt med list  Last.fmt message sent to pt - pt is still taking medication twice weekly and needs new rx sent to [facility]    R-(recommendations): Routing to provider team to fill pt rx in Dr. Mosqueda's absence   Pt does have appt on 3/24/24 with Dr. Salomon but needs refill now  Medication in pending status if deemed appropriate - strength/dose, frequency, route consistent with original rx by Dr. Mosqueda    Please review and sign if appropriate    Thanks!    Melanie RN  [facility]

## 2024-04-03 ENCOUNTER — OFFICE VISIT (OUTPATIENT)
Dept: OBGYN | Facility: CLINIC | Age: 49
End: 2024-04-03
Payer: COMMERCIAL

## 2024-04-03 VITALS
BODY MASS INDEX: 25.87 KG/M2 | DIASTOLIC BLOOD PRESSURE: 78 MMHG | RESPIRATION RATE: 18 BRPM | HEIGHT: 63 IN | SYSTOLIC BLOOD PRESSURE: 120 MMHG | TEMPERATURE: 98.2 F | HEART RATE: 18 BPM | WEIGHT: 146 LBS

## 2024-04-03 DIAGNOSIS — N94.10 DYSPAREUNIA, FEMALE: ICD-10-CM

## 2024-04-03 DIAGNOSIS — N95.2 ATROPHIC VAGINITIS: ICD-10-CM

## 2024-04-03 DIAGNOSIS — Z01.419 ENCOUNTER FOR ANNUAL ROUTINE GYNECOLOGICAL EXAMINATION: Primary | ICD-10-CM

## 2024-04-03 LAB
CHOLEST SERPL-MCNC: 172 MG/DL
FASTING STATUS PATIENT QL REPORTED: NO
HBA1C MFR BLD: 5.3 % (ref 0–5.6)
HDLC SERPL-MCNC: 68 MG/DL
LDLC SERPL CALC-MCNC: 90 MG/DL
NONHDLC SERPL-MCNC: 104 MG/DL
T3FREE SERPL-MCNC: 3 PG/ML (ref 2–4.4)
T4 FREE SERPL-MCNC: 0.99 NG/DL (ref 0.9–1.7)
TRIGL SERPL-MCNC: 71 MG/DL
TSH SERPL DL<=0.005 MIU/L-ACNC: 0.5 UIU/ML (ref 0.3–4.2)

## 2024-04-03 PROCEDURE — 84439 ASSAY OF FREE THYROXINE: CPT | Performed by: OBSTETRICS & GYNECOLOGY

## 2024-04-03 PROCEDURE — 99459 PELVIC EXAMINATION: CPT | Performed by: OBSTETRICS & GYNECOLOGY

## 2024-04-03 PROCEDURE — 99213 OFFICE O/P EST LOW 20 MIN: CPT | Mod: 25 | Performed by: OBSTETRICS & GYNECOLOGY

## 2024-04-03 PROCEDURE — 99396 PREV VISIT EST AGE 40-64: CPT | Performed by: OBSTETRICS & GYNECOLOGY

## 2024-04-03 PROCEDURE — 84481 FREE ASSAY (FT-3): CPT | Performed by: OBSTETRICS & GYNECOLOGY

## 2024-04-03 PROCEDURE — 80061 LIPID PANEL: CPT | Performed by: OBSTETRICS & GYNECOLOGY

## 2024-04-03 PROCEDURE — 36415 COLL VENOUS BLD VENIPUNCTURE: CPT | Performed by: OBSTETRICS & GYNECOLOGY

## 2024-04-03 PROCEDURE — 84443 ASSAY THYROID STIM HORMONE: CPT | Performed by: OBSTETRICS & GYNECOLOGY

## 2024-04-03 PROCEDURE — 83036 HEMOGLOBIN GLYCOSYLATED A1C: CPT | Performed by: OBSTETRICS & GYNECOLOGY

## 2024-04-03 RX ORDER — ESTRADIOL 10 UG/1
10 INSERT VAGINAL
Qty: 24 TABLET | Refills: 3 | Status: SHIPPED | OUTPATIENT
Start: 2024-04-04

## 2024-04-03 RX ORDER — ESTRADIOL 0.1 MG/G
2 CREAM VAGINAL DAILY
Qty: 42.5 G | Refills: 5 | Status: SHIPPED | OUTPATIENT
Start: 2024-04-03

## 2024-04-03 SDOH — HEALTH STABILITY: PHYSICAL HEALTH: ON AVERAGE, HOW MANY DAYS PER WEEK DO YOU ENGAGE IN MODERATE TO STRENUOUS EXERCISE (LIKE A BRISK WALK)?: 6 DAYS

## 2024-04-03 ASSESSMENT — SOCIAL DETERMINANTS OF HEALTH (SDOH): HOW OFTEN DO YOU GET TOGETHER WITH FRIENDS OR RELATIVES?: ONCE A WEEK

## 2024-04-03 NOTE — NURSING NOTE
"Initial /78 (BP Location: Right arm, Patient Position: Chair, Cuff Size: Adult Regular)   Pulse (!) 18   Temp 98.2  F (36.8  C) (Tympanic)   Resp 18   Ht 1.588 m (5' 2.5\")   Wt 66.2 kg (146 lb)   LMP 08/12/2016   BMI 26.28 kg/m   Estimated body mass index is 26.28 kg/m  as calculated from the following:    Height as of this encounter: 1.588 m (5' 2.5\").    Weight as of this encounter: 66.2 kg (146 lb). .    "

## 2024-04-03 NOTE — PROGRESS NOTES
SUBJECTIVE:   CC: Vianey Ryan is an 48 year old woman who presents for preventive health visit.       Patient has been advised of split billing requirements and indicates understanding: Yes    Healthy Habits:  Do you get at least three servings of calcium containing foods daily (dairy, green leafy vegetables, etc.)? yes  Amount of exercise or daily activities, outside of work: 6 day(s) per week  Problems taking medications regularly No  Medication side effects: No  Have you had an eye exam in the past two years? yes  Do you see a dentist twice per year? yes  Do you have sleep apnea, excessive snoring or daytime drowsiness?no    Today's PHQ-2 Score:       4/3/2024    10:26 AM 4/24/2023     7:06 AM   PHQ-2 ( 1999 Pf[employer])   Q1: Little interest or pleasure in doing things 0 0   Q2: Feeling down, depressed or hopeless 0 0   PHQ-2 Score 0 0   Q1: Little interest or pleasure in doing things Not at all Not at all   Q2: Feeling down, depressed or hopeless Not at all Not at all   PHQ-2 Score 0 0       Social History     Tobacco Use    Smoking status: Never    Smokeless tobacco: Never   Substance Use Topics    Alcohol use: No                   Reviewed orders with patient.  Reviewed health maintenance and updated orders accordingly - Yes  Lab work is in process    FHS-7:       12/8/2022     7:19 AM   Breast CA Risk Assessment (FHS-7)   Did any of your first-degree relatives have breast or ovarian cancer? Yes   Did any of your relatives have bilateral breast cancer? No   Did any man in your family have breast cancer? No   Did any woman in your family have breast and ovarian cancer? No   Did any woman in your family have breast cancer before age 50 y? No   Do you have 2 or more relatives with breast and/or ovarian cancer? No   Do you have 2 or more relatives with breast and/or bowel cancer? No       Mammogram Screening - Mammogram every 1-2 years updated in Health Maintenance based on mutual decision making  Pertinent  mammograms are reviewed under the imaging tab.    Pertinent mammograms are reviewed under the imaging tab.  History of abnormal Pap smear: Status post benign hysterectomy. Health Maintenance and Surgical History updated.      1/15/2014    12:00 AM 2011    12:00 AM 3/6/2009    12:00 AM   PAP / HPV   PAP (Historical) NIL  NIL  NIL      Reviewed and updated as needed this visit by clinical staff   Tobacco  Allergies  Meds              Reviewed and updated as needed this visit by Provider                  Past Medical History:   Diagnosis Date    Allergy to cats     Anemia 2015    Basal cell carcinoma     Chickenpox     Depressive disorder, not elsewhere classified     Depression (non-psychotic)    Metrorrhagia 2010    Ovarian cyst 2008    PONV (postoperative nausea and vomiting)     Severe preeclampsia 2006    Thyroid disease     Urinary tract infections       Past Surgical History:   Procedure Laterality Date    C/SECTION, LOW TRANSVERSE  2006    Severe pre-eclampsia     SECTION, TUBAL LIGATION, COMBINED N/A 2015    Procedure: COMBINED  SECTION, TUBAL LIGATION;  Surgeon: Tay Mosqueda MD;  Location: [facility]    COLONOSCOPY  2013    Procedure: COLONOSCOPY;  Colonoscopy;  Surgeon: Manan Rangel MD;  Location: [facility]    CYSTOSCOPY N/A 2016    Procedure: CYSTOSCOPY;  Surgeon: Tay Mosqueda MD;  Location: [facility]    D & C  2010    HC MOHS HEAD/NCK/HND/FT/GEN 1ST STAGE UP T0 5 BLOCKS  2009    Mohs excision right central forehead BCC    HYSTERECTOMY, PAP NO LONGER INDICATED  2016    HYSTEROSCOPY  2010    LAPAROSCOPIC CHOLECYSTECTOMY N/A 2015    Procedure: LAPAROSCOPIC CHOLECYSTECTOMY;  Surgeon: Landon Schultz MD;  Location: [facility]    LAPAROSCOPIC HYSTERECTOMY TOTAL N/A 2016    Procedure: LAPAROSCOPIC HYSTERECTOMY TOTAL;  Surgeon: Tay Mosqueda MD;  Location: [facility]    MOHS MICROGRAPHIC PROCEDURE    "   TONSILLECTOMY       OB History    Para Term  AB Living   4 3 2 1 1 3   SAB IAB Ectopic Multiple Live Births   1 0 0 0 3      # Outcome Date GA Lbr Fredy/2nd Weight Sex Delivery Anes PTL Lv   4 Term 04/03/15 37w5d  3.43 kg (7 lb 9 oz) M CS-LTranv Spinal N NEERAJ      Name: Fausto      Apgar1: 8  Apgar5: 8   3 Term 11 39w0d  3.487 kg (7 lb 11 oz) F CS-Unspec   NEERAJ      Birth Comments: anemia      Name: Teresa      Apgar1: 9  Apgar5: 9   2  06 35w4d 03:00 3.175 kg (7 lb) F CS   NEERAJ      Birth Comments: CS - severe pre-eclampsia.       Name: Cindy       Apgar1: 8  Apgar5: 9   1 SAB 04 6w0d    SAB   DEC     ROS:  CONSTITUTIONAL: NEGATIVE for fever, chills, change in weight  INTEGUMENTARY/SKIN: NEGATIVE for worrisome rashes, moles or lesions  EYES: NEGATIVE for vision changes or irritation  ENT: NEGATIVE for ear, mouth and throat problems  RESP: NEGATIVE for significant cough or SOB  BREAST: NEGATIVE for masses, tenderness or discharge  CV: NEGATIVE for chest pain, palpitations or peripheral edema  GI: NEGATIVE for nausea, abdominal pain, heartburn, or change in bowel habits  : NEGATIVE for unusual urinary or vaginal symptoms. No vaginal bleeding. Vaginal pain and dryness  MUSCULOSKELETAL: NEGATIVE for significant arthralgias or myalgia  NEURO: NEGATIVE for weakness, dizziness or paresthesias  PSYCHIATRIC: NEGATIVE for changes in mood or affect     OBJECTIVE:   /78 (BP Location: Right arm, Patient Position: Chair, Cuff Size: Adult Regular)   Pulse (!) 18   Temp 98.2  F (36.8  C) (Tympanic)   Resp 18   Ht 1.588 m (5' 2.5\")   Wt 66.2 kg (146 lb)   LMP 2016   BMI 26.28 kg/m    EXAM:  GENERAL: alert and no distress  EYES: Eyes grossly normal to inspection  NECK: no adenopathy, no asymmetry, masses, or scars  RESP: breathing comfortably on room air with no appreciable cough or wheeze  CV: regular rate, warm and well-perfused, normatensive   ABDOMEN: soft, nontender, no " hepatosplenomegaly, no masses and bowel sounds normal   (female): normal urethral meatus, vaginal mucosal atrophy, narrowed vaginal introitus, vaginal skin findings: small brown macule on left side of vulva (stable per pt), and normal post-hysterectomy exam without masses.   MS: no gross musculoskeletal defects noted, no edema  SKIN: no suspicious lesions or rashes  NEURO: Normal strength and tone, mentation intact and speech normal  PSYCH: mentation appears normal, affect normal/bright    Diagnostic Test Results:  Labs reviewed in Epic    ASSESSMENT/PLAN:   Anne was seen today for her annual physical exam and for continued dyspareunia. Routine labs checked, including lipid panel and Hgb A1C, and thyroid labs d/t pt's PMH. Anne continues to have dyspareunia despite initiation of Vagifem 10 mcg tabs 2x weekly with prior OB-GYN. On exam, marked atrophic vaginitis including narrowing of the vaginal introitus was noted which is most likely the cause of her continued dyspareunia. Vagifem twice weekly is likely not enough to offset her vaginal atrophy as it seems to have progressed. Anne is very agreeable to changing and/or increasing the estrogen delivery. Anne will start using Estrace vaginal cream daily for 2 weeks, then decreasing to every other day after that. After her tissue has been exposed to this increased amount of estrogen, Anne can try the Estring vaginal ring which needs to be replaced every 3 months. She was counseled that the vaginal ring will likely need to be removed for intercourse. Anne will also purchase vaginal dilation rods and will gradually increase the size of gretel inserted as tolerated in order to widen the vaginal introitus and make intercourse with her  more comfortable. 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     Diagnoses and all orders for this visit:    Encounter for annual  "routine gynecological examination  -     *MA Screening Digital Bilateral  -     Lipid panel reflex to direct LDL Non-fasting  -     Hemoglobin A1c - wnl (5.3)  Hx goiter and hyperthyroidism  -  TSH  -     T4, free  -     T3, Free    Dyspareunia and atrophic vaginitis  -     estradiol (VAGIFEM) 10 MCG TABS vaginal tablet; Place 1 tablet (10 mcg) vaginally twice a week  -     estradiol (ESTRING) 7.5 MCG/24HR vaginal ring; Place 1 each (1 Vaginal ring) vaginally every 3 months  -     estradiol (ESTRACE) 0.1 MG/GM vaginal cream; Place 2 g vaginally daily For two weeks, then decrease to ever other day.    Patient has been advised of split billing requirements and indicates understanding: Yes  COUNSELING:   Reviewed preventive health counseling, as reflected in patient instructions  Special attention given to:        Regular exercise       (Misty)menopause management    Estimated body mass index is 26.28 kg/m  as calculated from the following:    Height as of this encounter: 1.588 m (5' 2.5\").    Weight as of this encounter: 66.2 kg (146 lb).    Weight management plan: Discussed healthy diet and exercise guidelines    She reports that she has never smoked. She has never used smokeless tobacco.      Counseling Resources:  ATP IV Guidelines  Pooled Cohorts Equation Calculator  Breast Cancer Risk Calculator  BRCA-Related Cancer Risk Assessment: FHS-7 Tool  FRAX Risk Assessment  ICSI Preventive Guidelines  Dietary Guidelines for Americans, 2010  USDA's MyPlate  ASA Prophylaxis  Lung CA Screening    This patient was seen and staffed with my attending physician.     Jeff Alan, MS3  [employer]    Anne-Marie Chatterjee MD  [facility] [place]    "

## 2024-05-28 ENCOUNTER — HOSPITAL ENCOUNTER (OUTPATIENT)
Dept: MAMMOGRAPHY | Facility: CLINIC | Age: 49
Discharge: HOME | End: 2024-05-28
Attending: OBSTETRICS & GYNECOLOGY | Admitting: OBSTETRICS & GYNECOLOGY
Payer: COMMERCIAL

## 2024-05-28 DIAGNOSIS — Z12.31 VISIT FOR SCREENING MAMMOGRAM: ICD-10-CM

## 2024-05-28 PROCEDURE — 77063 BREAST TOMOSYNTHESIS BI: CPT

## 2024-10-22 ENCOUNTER — IMMUNIZATION (OUTPATIENT)
Dept: FAMILY MEDICINE | Facility: CLINIC | Age: 49
End: 2024-10-22
Payer: COMMERCIAL

## 2024-10-22 PROCEDURE — 90656 IIV3 VACC NO PRSV 0.5 ML IM: CPT

## 2024-10-22 PROCEDURE — 91320 SARSCV2 VAC 30MCG TRS-SUC IM: CPT

## 2024-10-22 PROCEDURE — 90480 ADMN SARSCOV2 VAC 1/ONLY CMP: CPT

## 2024-10-22 PROCEDURE — 90471 IMMUNIZATION ADMIN: CPT

## 2025-04-30 ENCOUNTER — OFFICE VISIT (OUTPATIENT)
Dept: OBGYN | Facility: CLINIC | Age: 50
End: 2025-04-30
Payer: COMMERCIAL

## 2025-04-30 VITALS
BODY MASS INDEX: 25.69 KG/M2 | RESPIRATION RATE: 18 BRPM | TEMPERATURE: 97.7 F | HEIGHT: 63 IN | SYSTOLIC BLOOD PRESSURE: 120 MMHG | DIASTOLIC BLOOD PRESSURE: 81 MMHG | WEIGHT: 145 LBS | HEART RATE: 84 BPM

## 2025-04-30 DIAGNOSIS — E04.9 GOITER: ICD-10-CM

## 2025-04-30 DIAGNOSIS — N95.1 VASOMOTOR SYMPTOMS DUE TO MENOPAUSE: ICD-10-CM

## 2025-04-30 DIAGNOSIS — N95.2 ATROPHIC VAGINITIS: ICD-10-CM

## 2025-04-30 DIAGNOSIS — Z01.419 ENCOUNTER FOR ANNUAL ROUTINE GYNECOLOGICAL EXAMINATION: ICD-10-CM

## 2025-04-30 DIAGNOSIS — Z00.00 ANNUAL PHYSICAL EXAM: Primary | ICD-10-CM

## 2025-04-30 LAB
HCV AB SERPL QL IA: NONREACTIVE
T3FREE SERPL-MCNC: 3.3 PG/ML (ref 2–4.4)
T4 FREE SERPL-MCNC: 1.03 NG/DL (ref 0.9–1.7)
TSH SERPL DL<=0.005 MIU/L-ACNC: 0.65 UIU/ML (ref 0.3–4.2)

## 2025-04-30 PROCEDURE — 84443 ASSAY THYROID STIM HORMONE: CPT | Performed by: STUDENT IN AN ORGANIZED HEALTH CARE EDUCATION/TRAINING PROGRAM

## 2025-04-30 PROCEDURE — 36415 COLL VENOUS BLD VENIPUNCTURE: CPT | Performed by: STUDENT IN AN ORGANIZED HEALTH CARE EDUCATION/TRAINING PROGRAM

## 2025-04-30 PROCEDURE — 84481 FREE ASSAY (FT-3): CPT | Performed by: STUDENT IN AN ORGANIZED HEALTH CARE EDUCATION/TRAINING PROGRAM

## 2025-04-30 PROCEDURE — 84439 ASSAY OF FREE THYROXINE: CPT | Performed by: STUDENT IN AN ORGANIZED HEALTH CARE EDUCATION/TRAINING PROGRAM

## 2025-04-30 PROCEDURE — 86803 HEPATITIS C AB TEST: CPT | Performed by: STUDENT IN AN ORGANIZED HEALTH CARE EDUCATION/TRAINING PROGRAM

## 2025-04-30 RX ORDER — ESTRADIOL 10 UG/1
10 TABLET, FILM COATED VAGINAL
Qty: 24 TABLET | Refills: 3 | Status: CANCELLED | OUTPATIENT
Start: 2025-05-01

## 2025-04-30 RX ORDER — ESTRADIOL 0.1 MG/G
2 CREAM VAGINAL DAILY
Qty: 42.5 G | Refills: 5 | Status: CANCELLED | OUTPATIENT
Start: 2025-04-30

## 2025-04-30 RX ORDER — ESTRADIOL ACETATE 0.05 MG/D
1 RING VAGINAL
Qty: 1 EACH | Refills: 4 | Status: SHIPPED | OUTPATIENT
Start: 2025-04-30

## 2025-04-30 NOTE — NURSING NOTE
"Initial /81 (BP Location: Left arm, Patient Position: Chair, Cuff Size: Adult Regular)   Pulse 84   Temp 97.7  F (36.5  C) (Tympanic)   Resp 18   Ht 1.588 m (5' 2.5\")   Wt 65.8 kg (145 lb)   LMP 08/12/2016   BMI 26.10 kg/m   Estimated body mass index is 26.1 kg/m  as calculated from the following:    Height as of this encounter: 1.588 m (5' 2.5\").    Weight as of this encounter: 65.8 kg (145 lb). .    "

## 2025-04-30 NOTE — PROGRESS NOTES
[facility]  Annual Exam Note    ASSESSMENT/PLAN:   Vianey Ryan, 49 year old  s/p total hysterectomy + BSO, is here for annual exam  -Other concerns:    Vaginal atrophy + vasomotor symptoms of menopause: recommended trying Femring vaginal ring 12.4mg for 90 days with 0.05mg/day release for both vaginal atrophy and vasomotor symptoms. The vaginal ring can be left in place continuously and need to be taken out for sexual intercourse. Discussed that if insurance is problemsome, she should take the vaginal cream daily for 1-2 months while to get the Femring approved.  Estradiol Acetate (FEMRING) 0.05 MG/24HR RING          Place 1 Ring vaginally every 3 months., Disp-1 each, R-4, E-Prescribe    Thyroid nodule: improving and asymptomatic. TSH, FT3, FT4 obtained today, which returned normal. Recommended thyroid ultrasound if her thyroid nodules enlarge, which has been ordered.  -Cervical cancer screening: s/p total hysterectomy, no more pap smear indicated.  -Breast cancer screening: no increased risk of breast cancer, last mammogram 2024 BI-RADS 1, repeat mammogram ordered now  -Colon cancer screening: colonoscopy 10/5/2023 nl. Recommended repeat colonoscopy in  due to father had colon polyps in his 50s.  -Osteoporosis screening: no increased risk, plan DEXA scan at age 65  -Routine labs: lipid panel and HgbA1c normal from . Did not repeat today  -STI screening: hepC bang obtained today, which is negative.  -Smoking: pt is a non-smoker  -Weight: Body mass index is 26.1 kg/m . Continue healthy lifestyle with exercise and healthy diet.  -Immunizations: last Tdap . S/p influenza vaccine.    Return to GYN clinic for an annual exam, or as needed.     Miriam Pineda MD  Obstetrics and Gynecology  [facility]   2025     -----------------------------------------------------------------------------------------------   SUBJECTIVE:   Vianey Ryan, 49 year old   female, is her for a preventive health visit. She has the following concerns:    -Hot flushes for the past several years that improving. Night sweats and waking her up occasionally. No mood changes. Brain fog and word finding slowless. Difficulty with weight loss. At the highest weight of her life despite being very active and eating healthy.  -Worsening vaginal atrophy      Patient has been advised of split billing requirements and indicates understanding: Yes      Review Of Systems: A 10 pt ROS was completed and found to be negative unless mentioned in the HPI.     OB History    Para Term  AB Living   4 3 2 1 1 3   SAB IAB Ectopic Multiple Live Births   1 0 0 0 3      # Outcome Date GA Lbr Fredy/2nd Weight Sex Type Anes PTL Lv   4 Term 04/03/15 37w5d  3.43 kg (7 lb 9 oz) M CS-LTranv Spinal N NEERAJ      Name: Fausto      Apgar1: 8  Apgar5: 8   3 Term 11 39w0d  3.487 kg (7 lb 11 oz) F CS-Unspec   NEERAJ      Birth Comments: anemia      Name: Teresa      Apgar1: 9  Apgar5: 9   2  06 35w4d 03:00 3.175 kg (7 lb) F CS   NEERAJ      Birth Comments: CS - severe pre-eclampsia.       Name: Cindy       Apgar1: 8  Apgar5: 9   1 SAB 04 6w0d    SAB   DEC      Past Medical History:   Diagnosis Date    Allergy to cats     Basal cell carcinoma     x2; Mohs' procedure x2    Chickenpox     Goiter     Irritable bowel syndrome     Ovarian cyst 2008    PONV (postoperative nausea and vomiting)     Recurrent UTI     Severe preeclampsia 2006    Thyroid disease     Thyroid nodule      Past Surgical History:   Procedure Laterality Date    Bilateral distal salpingectomy Bilateral     C/SECTION, LOW TRANSVERSE  2006    Severe pre-eclampsia     SECTION       SECTION, TUBAL LIGATION, COMBINED N/A 2015    Procedure: COMBINED  SECTION, TUBAL LIGATION;  Surgeon: Tay Mosqueda MD;  Location: [facility]    COLONOSCOPY  2013    Procedure: COLONOSCOPY;   "Colonoscopy;  Surgeon: Manan Rangel MD;  Location: [facility]    CYSTOSCOPY N/A 08/29/2016    Procedure: CYSTOSCOPY;  Surgeon: Tay Mosqueda MD;  Location: [facility]    D & C  08/06/2010    HC MOHS HEAD/NCK/HND/FT/GEN 1ST STAGE UP T0 5 BLOCKS  04/14/2009    Mohs excision right central forehead BCC    HYSTERECTOMY, PAP NO LONGER INDICATED  08/29/2016    HYSTEROSCOPY  08/06/2010    LAPAROSCOPIC CHOLECYSTECTOMY N/A 09/29/2015    Procedure: LAPAROSCOPIC CHOLECYSTECTOMY;  Surgeon: Landon Schultz MD;  Location: [facility]    LAPAROSCOPIC HYSTERECTOMY TOTAL N/A 08/29/2016    Procedure: LAPAROSCOPIC HYSTERECTOMY TOTAL;  Surgeon: Tay Mosqueda MD;  Location: [facility]    MOHS MICROGRAPHIC PROCEDURE      TONSILLECTOMY       Current Outpatient Medications   Medication Sig Dispense Refill    estradiol (ESTRACE) 0.1 MG/GM vaginal cream Place 2 g vaginally daily For two weeks, then decrease to ever other day. 42.5 g 5    estradiol (VAGIFEM) 10 MCG TABS vaginal tablet Place 1 tablet (10 mcg) vaginally twice a week 24 tablet 3     Allergies   Allergen Reactions    Nkda [No Known Drug Allergy]    Family History: mom had breast cancer (diagnosed at age 62; genetic test for mom was negative)  Family History   Problem Relation Age of Onset    Hypertension Father     Sleep Apnea Father     Hypertension Paternal Grandmother     Lipids Paternal Grandmother     Thyroid Disease Paternal Grandmother     Blood Disease Mother         Anemia    Diabetes Mother         \"borderline diabetic\"    Breast Cancer Mother 63        triple negative stage 2A    Alcohol/Drug Paternal Grandfather         alcohol    Cancer Maternal Grandfather         skin    Alzheimer Disease Maternal Grandmother      Social History: denied smoking, alcohol use, recreational drug use. Attony.    Exercise/diet: 3-5 servings of dairy, 3-5 servings fruits/veggies, 6-7 days of exercise a week; teaches yoga sculpt at [employer]; cycles and row; healthy diet; unchanged in " "lifestyle and diet, at the highest weight.     Healthy Habits:  Do you get at least three servings of calcium containing foods daily (dairy, green leafy vegetables, etc.)? yes  Amount of exercise or daily activities, outside of work: 6 day(s) per week  Problems taking medications regularly yes  Medication side effects: No  Do you see a dentist twice per year? yes  Do you have sleep apnea, excessive snoring or daytime drowsiness?no     Abuse: Current or Past(Physical, Sexual or Emotional)- No  Do you feel safe in your environment? Yes         4/30/2025    10:36 AM 4/3/2024    10:26 AM   PHQ-2 ( 1999 [employer])   Q1: Little interest or pleasure in doing things 0 0   Q2: Feeling down, depressed or hopeless 0 0   PHQ-2 Score 0 0   Q1: Little interest or pleasure in doing things  Not at all   Q2: Feeling down, depressed or hopeless  Not at all   PHQ-2 Score  0     OBJECTIVE:   /81 (BP Location: Left arm, Patient Position: Chair, Cuff Size: Adult Regular)   Pulse 84   Temp 97.7  F (36.5  C) (Tympanic)   Resp 18   Ht 1.588 m (5' 2.5\")   Wt 65.8 kg (145 lb)   LMP 08/12/2016   BMI 26.10 kg/m    Constitutional: Healthy appearing female, no acute distress  HEENT: Normal appearance.  Neck supple, thyroid normal in size without nodularity or masses.  Cardiovascular: Regular rate and rhythm without murmurs, clicks, gallops or rub  Respiratory: Clear to auscultation bilaterally without crackles or wheeze  Breast Exam: No visible masses or suspicious skin changes.  No discrete or dominant masses to palpation.  No axillary lymphadenopathy.  Gastrointestinal: Abdomen soft, non-tender. BS normal. No masses, organomegaly  Pelvic Exam - : external vulvar area appears to be atrophic with thinning of the tissues and labial minora and labial majora fusion. No obvious excoriations, lesions, or rashes. Bartholins, urethra, skeins normal. The introitus is very narrowed that a Medium Yaya's speculum cannot fit.   Bimanual: the " vaginal mucosa palpated smooth without lesions. Surgically missing cervix and uterus.  Skin: No suspicious lesions or rashes  Psychiatric: mentation appears normal and affect normal/bright    Component      Latest Ref Rn 4/30/2025  11:15 AM   Hepatitis C Antibody      Nonreactive  Nonreactive    TSH      0.30 - 4.20 uIU/mL 0.65    Free T3      2.0 - 4.4 pg/mL 3.3    T4 Free      0.90 - 1.70 ng/dL 1.03

## 2025-04-30 NOTE — PROGRESS NOTES
SUBJECTIVE:   CC: Vianey Ryan is an 49 year old woman who presents for preventive health visit.     {Split Bill scripting  The purpose of this visit is to discuss your medical history and prevent health problems before you are sick. You may be responsible for a co-pay, coinsurance, or deductible if your visit today includes services such as checking on a sore throat, having an x-ray or lab test, or treating and evaluating a new or existing condition :710817}  Patient has been advised of split billing requirements and indicates understanding: Yes  Healthy Habits:  Do you get at least three servings of calcium containing foods daily (dairy, green leafy vegetables, etc.)? yes  Amount of exercise or daily activities, outside of work: 6 day(s) per week  Problems taking medications regularly yes  Medication side effects: No  Have you had an eye exam in the past two years? { :624524}  Do you see a dentist twice per year? yes  Do you have sleep apnea, excessive snoring or daytime drowsiness?no          Today's PHQ-2 Score:       4/30/2025    10:36 AM 4/3/2024    10:26 AM   PHQ-2 ( 1999 [employer])   Q1: Little interest or pleasure in doing things 0 0   Q2: Feeling down, depressed or hopeless 0 0   PHQ-2 Score 0 0   Q1: Little interest or pleasure in doing things  Not at all   Q2: Feeling down, depressed or hopeless  Not at all   PHQ-2 Score  0       Abuse: Current or Past(Physical, Sexual or Emotional)- No  Do you feel safe in your environment? Yes        Social History     Tobacco Use    Smoking status: Never    Smokeless tobacco: Never   Substance Use Topics    Alcohol use: No     If you drink alcohol do you typically have >3 drinks per day or >7 drinks per week? No                     Reviewed orders with patient.  Reviewed health maintenance and updated orders accordingly - {Yes/No:602549}  {Chronicprobdata (Optional):882985}    FHS-7:       12/8/2022     7:19 AM 5/28/2024     6:39 PM   Breast CA Risk Assessment  "(FHS-7)   Did any of your first-degree relatives have breast or ovarian cancer? Yes Yes   Did any of your relatives have bilateral breast cancer? No No   Did any man in your family have breast cancer? No No   Did any woman in your family have breast and ovarian cancer? No No   Did any woman in your family have breast cancer before age 50 y? No No   Do you have 2 or more relatives with breast and/or ovarian cancer? No No   Do you have 2 or more relatives with breast and/or bowel cancer? No No     {If any of the questions to the BCRA (FHS-7) are answered yes, consider ordering referral for genetic counseling (Optional) :917906}  {AMB Mammogram Decision Support (Optional) :601844}  Pertinent mammograms are reviewed under the imaging tab.    Pertinent mammograms are reviewed under the imaging tab.  History of abnormal Pap smear: {PAP HX:792300}      1/15/2014    12:00 AM 2/2/2011    12:00 AM 3/6/2009    12:00 AM   PAP / HPV   PAP (Historical) NIL  NIL  NIL      Reviewed and updated as needed this visit by clinical staff   Tobacco  Allergies    Med Hx  Surg Hx  Fam Hx  Soc Hx        Reviewed and updated as needed this visit by Provider                  {HISTORY OPTIONS (Optional):228517}    ROS:  { :720230}    OBJECTIVE:   /81 (BP Location: Left arm, Patient Position: Chair, Cuff Size: Adult Regular)   Pulse 84   Temp 97.7  F (36.5  C) (Tympanic)   Resp 18   Ht 1.588 m (5' 2.5\")   Wt 65.8 kg (145 lb)   LMP 08/12/2016   BMI 26.10 kg/m    EXAM:  {Exam Choices:560878}    {Diagnostic Test Results (Optional):359610}    ASSESSMENT/PLAN:   {Diag Picklist:310095}    Patient has been advised of split billing requirements and indicates understanding: {YES / NO:431518::\"Yes\"}  COUNSELING:   {FEMALE COUNSELING MESSAGES:583206::\"Reviewed preventive health counseling, as reflected in patient instructions\"}    Estimated body mass index is 26.1 kg/m  as calculated from the following:    Height as of this encounter: " "1.588 m (5' 2.5\").    Weight as of this encounter: 65.8 kg (145 lb).    {Weight Management Plan (ACO) Complete if BMI is abnormal-  Ages 18-64  BMI >24.9.  Age 65+ with BMI <23 or >30 (Optional):010625}    She reports that she has never smoked. She has never used smokeless tobacco.      Counseling Resources:  ATP IV Guidelines  Pooled Cohorts Equation Calculator  Breast Cancer Risk Calculator  BRCA-Related Cancer Risk Assessment: FHS-7 Tool  FRAX Risk Assessment  ICSI Preventive Guidelines  Dietary Guidelines for Americans, 2010  USDA's MyPlate  ASA Prophylaxis  Lung CA Screening    Miriam Pineda MD  [facility] [place]   "

## 2025-05-05 NOTE — MR AVS SNAPSHOT
After Visit Summary   3/27/2017    Vianey Ryan    MRN: [id number]           Patient Information     Date Of Birth          1975        Visit Information        Provider Department      3/27/2017 2:40 PM Frank Washburn MD [facility]        Today's Diagnoses     Dysfunction of eustachian tube, left    -  1      Care Instructions    Try some Flonase to help your ear drain.  When spraying into the nose, aim towards the ears for the best effect.            Follow-ups after your visit        Additional Services     OTOLARYNGOLOGY REFERRAL       Your provider has referred you to: [facility]: [facility] [phone]   [email or web address]    Please be aware that coverage of these services is subject to the terms and limitations of your health insurance plan.  Call member services at your health plan with any benefit or coverage questions.      Please bring the following with you to your appointment:    (1) Any X-Rays, CTs or MRIs which have been performed.  Contact the facility where they were done to arrange for  prior to your scheduled appointment.   (2) List of current medications  (3) This referral request   (4) Any documents/labs given to you for this referral                  Who to contact     If you have questions or need follow up information about today's clinic visit or your schedule please contact [facility] directly at [phone].  Normal or non-critical lab and imaging results will be communicated to you by MyChart, letter or phone within 4 business days after the clinic has received the results. If you do not hear from us within 7 days, please contact the clinic through MyChart or phone. If you have a critical or abnormal lab result, we will notify you by phone as soon as possible.  Submit refill requests through [employer] or call your pharmacy and they will forward the refill request to us. Please allow 3 business  Consider Flonase 1 spray per nostril per day.    "days for your refill to be completed.          Additional Information About Your Visit        MyChart Information     [employer] gives you secure access to your electronic health record. If you see a primary care provider, you can also send messages to your care team and make appointments. If you have questions, please call your primary care clinic.  If you do not have a primary care provider, please call [phone] and they will assist you.        Care EveryWhere ID     This is your Care EveryWhere ID. This could be used by other organizations to access your [place] medical records  [id number]        Your Vitals Were     Pulse Temperature Height Last Period Pulse Oximetry BMI (Body Mass Index)    93 99.3  F (37.4  C) (Tympanic) 5' 2.5\" (1.588 m) 08/12/2016 97% 22.86 kg/m2       Blood Pressure from Last 3 Encounters:   03/27/17 119/66   03/23/17 120/72   03/22/17 111/73    Weight from Last 3 Encounters:   03/27/17 127 lb (57.6 kg)   03/22/17 124 lb 3.2 oz (56.3 kg)   10/18/16 124 lb (56.2 kg)              We Performed the Following     OTOLARYNGOLOGY REFERRAL          Today's Medication Changes          These changes are accurate as of: 3/27/17  3:20 PM.  If you have any questions, ask your nurse or doctor.               Start taking these medicines.        Dose/Directions    fluticasone 50 MCG/ACT spray   Commonly known as:  FLONASE   Used for:  Dysfunction of eustachian tube, left   Started by:  Frank Washburn MD        Dose:  1-2 spray   Spray 1-2 sprays into both nostrils daily   Quantity:  1 Bottle   Refills:  1            Where to get your medicines      These medications were sent to [id number] - [facility] [address] AT [facility] OF [address] [place] [address]  [address] [facility] [address]     Phone:  [phone]     fluticasone 50 MCG/ACT spray                Primary Care Provider Office Phone # Fax #    Jose Hearn [phone]       [facility] " [facility] [address] [facility] [address]        Thank you!     Thank you for choosing [facility]  for your care. Our goal is always to provide you with excellent care. Hearing back from our patients is one way we can continue to improve our services. Please take a few minutes to complete the written survey that you may receive in the mail after your visit with us. Thank you!             Your Updated Medication List - Protect others around you: Learn how to safely use, store and throw away your medicines at www.disposemymeds.org.          This list is accurate as of: 3/27/17  3:20 PM.  Always use your most recent med list.                   Brand Name Dispense Instructions for use    albuterol 108 (90 BASE) MCG/ACT Inhaler    PROAIR HFA/PROVENTIL HFA/VENTOLIN HFA    1 Inhaler    Inhale 2 puffs into the lungs every 6 hours as needed for shortness of breath / dyspnea or wheezing       amoxicillin 875 MG tablet    AMOXIL    20 tablet    Take 1 tablet (875 mg) by mouth 2 times daily for 10 days       benzonatate 200 MG capsule    TESSALON    21 capsule    Take 1 capsule (200 mg) by mouth 3 times daily as needed for cough       fluticasone 50 MCG/ACT spray    FLONASE    1 Bottle    Spray 1-2 sprays into both nostrils daily       guaiFENesin-codeine 100-10 MG/5ML Soln solution    ROBITUSSIN AC    120 mL    Take 5 mLs by mouth every 4 hours as needed for cough       predniSONE 20 MG tablet    DELTASONE    10 tablet    Take 2 tablets (40 mg) by mouth daily for 5 days

## 2025-05-30 ENCOUNTER — ANCILLARY PROCEDURE (OUTPATIENT)
Dept: MAMMOGRAPHY | Facility: CLINIC | Age: 50
End: 2025-05-30
Attending: STUDENT IN AN ORGANIZED HEALTH CARE EDUCATION/TRAINING PROGRAM
Payer: COMMERCIAL

## 2025-05-30 DIAGNOSIS — Z01.419 ENCOUNTER FOR ANNUAL ROUTINE GYNECOLOGICAL EXAMINATION: ICD-10-CM

## 2025-05-30 PROCEDURE — 77067 SCR MAMMO BI INCL CAD: CPT
